# Patient Record
Sex: FEMALE | Race: OTHER | HISPANIC OR LATINO | Employment: FULL TIME | URBAN - METROPOLITAN AREA
[De-identification: names, ages, dates, MRNs, and addresses within clinical notes are randomized per-mention and may not be internally consistent; named-entity substitution may affect disease eponyms.]

---

## 2019-08-08 ENCOUNTER — TRANSCRIBE ORDERS (OUTPATIENT)
Dept: ADMINISTRATIVE | Age: 44
End: 2019-08-08

## 2019-08-08 ENCOUNTER — APPOINTMENT (OUTPATIENT)
Dept: URGENT CARE | Age: 44
End: 2019-08-08
Payer: OTHER MISCELLANEOUS

## 2019-08-08 ENCOUNTER — APPOINTMENT (OUTPATIENT)
Dept: RADIOLOGY | Age: 44
End: 2019-08-08
Payer: OTHER MISCELLANEOUS

## 2019-08-08 DIAGNOSIS — T14.90XA INJURY: ICD-10-CM

## 2019-08-08 DIAGNOSIS — T14.90XA INJURY: Primary | ICD-10-CM

## 2019-08-08 PROCEDURE — G0382 LEV 3 HOSP TYPE B ED VISIT: HCPCS | Performed by: PREVENTIVE MEDICINE

## 2019-08-08 PROCEDURE — 99283 EMERGENCY DEPT VISIT LOW MDM: CPT | Performed by: PREVENTIVE MEDICINE

## 2019-08-15 ENCOUNTER — APPOINTMENT (OUTPATIENT)
Dept: URGENT CARE | Age: 44
End: 2019-08-15
Payer: OTHER MISCELLANEOUS

## 2019-08-15 PROCEDURE — 99213 OFFICE O/P EST LOW 20 MIN: CPT | Performed by: PREVENTIVE MEDICINE

## 2019-08-21 ENCOUNTER — TRANSCRIBE ORDERS (OUTPATIENT)
Dept: ADMINISTRATIVE | Facility: HOSPITAL | Age: 44
End: 2019-08-21

## 2019-08-21 DIAGNOSIS — M25.572 ACUTE LEFT ANKLE PAIN: Primary | ICD-10-CM

## 2019-08-28 ENCOUNTER — HOSPITAL ENCOUNTER (OUTPATIENT)
Dept: MRI IMAGING | Facility: HOSPITAL | Age: 44
Discharge: HOME | End: 2019-08-28
Attending: PREVENTIVE MEDICINE
Payer: OTHER MISCELLANEOUS

## 2019-08-28 DIAGNOSIS — M25.572 ACUTE LEFT ANKLE PAIN: ICD-10-CM

## 2019-08-28 PROCEDURE — 73721 MRI JNT OF LWR EXTRE W/O DYE: CPT

## 2019-08-29 ENCOUNTER — APPOINTMENT (OUTPATIENT)
Dept: URGENT CARE | Age: 44
End: 2019-08-29

## 2019-09-19 ENCOUNTER — APPOINTMENT (OUTPATIENT)
Dept: URGENT CARE | Age: 44
End: 2019-09-19
Payer: OTHER MISCELLANEOUS

## 2019-09-19 PROCEDURE — 99213 OFFICE O/P EST LOW 20 MIN: CPT | Performed by: PREVENTIVE MEDICINE

## 2019-10-03 ENCOUNTER — APPOINTMENT (OUTPATIENT)
Dept: URGENT CARE | Age: 44
End: 2019-10-03
Payer: OTHER MISCELLANEOUS

## 2019-10-03 PROCEDURE — 99213 OFFICE O/P EST LOW 20 MIN: CPT | Performed by: PHYSICIAN ASSISTANT

## 2019-10-07 ENCOUNTER — OFFICE VISIT (OUTPATIENT)
Dept: OBGYN CLINIC | Facility: HOSPITAL | Age: 44
End: 2019-10-07
Payer: OTHER MISCELLANEOUS

## 2019-10-07 VITALS
HEART RATE: 80 BPM | DIASTOLIC BLOOD PRESSURE: 87 MMHG | HEIGHT: 66 IN | WEIGHT: 200 LBS | BODY MASS INDEX: 32.14 KG/M2 | SYSTOLIC BLOOD PRESSURE: 126 MMHG

## 2019-10-07 DIAGNOSIS — M77.50 TENDONITIS OF ANKLE: Primary | ICD-10-CM

## 2019-10-07 PROCEDURE — 99203 OFFICE O/P NEW LOW 30 MIN: CPT | Performed by: PHYSICIAN ASSISTANT

## 2019-10-07 RX ORDER — IBUPROFEN 200 MG
200 TABLET ORAL EVERY 6 HOURS PRN
COMMUNITY

## 2019-10-07 NOTE — PROGRESS NOTES
Assessment/Plan   Diagnoses and all orders for this visit:    Tendonitis of ankle  - High CAM boot fitted and dispensed today  - Activity as tolerated in the boot  - Ice, NSAIDs as needed  - Start PT  - Follow up with sports medicine in 3 weeks  Subjective   Patient ID: Margarette Brito is a 37 y o  female  Vitals:    10/07/19 1407   BP: 126/87   Pulse: [de-identified]     42yo female comes in for her right ankle  She was injured at work in July when a pallet jack drove 2 pallets into her ankle  An MRI showed peroneal tendonitis  She has been seeing an unknown type of doctor in [facility]  She doesn't know the doctor's name  She states she has had no treatment  The pain is in the lateral aspect of the ankle/foot  The pain is dull in character, mild in severity, pain does not radiate and is not associated with numbness  The following portions of the patient's history were reviewed and updated as appropriate: allergies, current medications, past family history, past medical history, past social history, past surgical history and problem list     Review of Systems  Ortho Exam  History reviewed  No pertinent past medical history  History reviewed  No pertinent surgical history  Family History   Problem Relation Age of Onset    Diabetes Mother     Diabetes Father      Social History     Occupational History    Not on file   Tobacco Use    Smoking status: Never Smoker    Smokeless tobacco: Never Used   Substance and Sexual Activity    Alcohol use: Not on file    Drug use: Not on file    Sexual activity: Not on file       Review of Systems   Constitutional: Negative  HENT: Negative  Eyes: Negative  Respiratory: Negative  Cardiovascular: Negative  Gastrointestinal: Negative  Endocrine: Negative  Genitourinary: Negative  Musculoskeletal: As below      Allergic/Immunologic: Negative  Neurological: Negative  Hematological: Negative  Psychiatric/Behavioral: Negative  Objective   Physical Exam        I have personally reviewed pertinent films in PACS and my interpretation is no fracture        · Constitutional: Awake, Alert, Oriented  · Eyes: EOMI  · Psych: Mood and affect appropriate  · Heart: regular rate and rhythm  · Lungs: No audible wheezing  · Abdomen: soft  · Lymph: no lymphedema             left ankle:  - Appearance   No swelling, discoloration, deformity, or ecchymosis  - Palpation   No tenderness about the medial / lateral malleoli, deltoid, proximal fibula, atf, cf, ptf, talus, achilles or 5th MT and + tenderness over the peroneal tendons  - ROM   Full, pain-free, active ROM  - Special Tests   Negative anterior drawer  - Motor   normal 5/5 in all planes  - NVI distally

## 2019-10-07 NOTE — PATIENT INSTRUCTIONS
Ice Pack Application   WHAT YOU NEED TO KNOW:   Ice can be used to decrease swelling and pain after an injury or surgery  Common injuries that may benefit from ice therapy are sprains, strains, and bruises  The use of ice is most effective in the first 1 to 3 days after an injury  DISCHARGE INSTRUCTIONS:   How to apply ice:   · Fill a bag with crushed ice about half full  Remove the air from the bag before you close it  You can also use a bag of frozen vegetables  · Wrap the ice pack in a cloth to protect your skin from frostbite or other injury  · Put the ice over the injured area for 20 to 30 minutes or as long as directed  · Check your skin after about 30 seconds for color changes or blistering  Remove the ice if you notice skin changes or you feel burning or numbness in the area  · Throw the ice pack away after use  · Apply ice to your injured area 4 times each day or as directed  Ask your healthcare provider how many days you should apply ice  Contact your healthcare provider if:   · You see blisters, whitening of your skin, or a bluish color to your skin after using ice  · You feel burning or numbness when using ice  · You have questions about the use of ice packs  © 2017 [facility] Information is for End User's use only and may not be sold, redistributed or otherwise used for commercial purposes  All illustrations and images included in CareNotes® are the copyrighted property of [employer]  or Dario Gonzales  The above information is an  only  It is not intended as medical advice for individual conditions or treatments  Talk to your doctor, nurse or pharmacist before following any medical regimen to see if it is safe and effective for you  Safe Use of NSAIDs   WHAT YOU NEED TO KNOW:   NSAIDs are medicines that are used to decrease pain, swelling, and fever  NSAIDs are available with or without a doctor's order   NSAIDs that you can buy without a doctor's order include aspirin, ibuprofen, and naproxen  DISCHARGE INSTRUCTIONS:   Return to the emergency department if:   · You have swelling around your mouth or trouble breathing  · You are breathing fast or you have a fast heartbeat  · You have nausea, vomiting, or abdominal pain  · You have blood in your vomit or bowel movements  · You have a seizure  Contact your healthcare provider if:   · You have a headache or become confused  · You develop hearing loss or ringing in your ears  · You develop itching, a rash, or hives  · You have swelling around your lower legs, feet, ankles, and hands  · You do not know how much NSAIDs to give to your child  · You have questions or concerns about your condition or care  How to give NSAIDs to your child safely:   · Read the directions on the label  Find out if the medicine is right for your child's age and how much to give to your child  The dose for your child's weight or age should be listed  Do not  give your child more than the recommended amount  · Use the measuring tool that came with the medicine  Do not  use another measuring tool, such as a kitchen spoon  Other measuring tools do not provide the right amount of medicine  How to take NSAIDs safely:   · Read the directions on the label to learn how much medicine you should take and often to take it  Do not take more than the recommended amount  · Talk to your healthcare provider if you need take NSAIDs for more than 30 days  The longer you take NSAIDs, the higher your risk of side effects will be  You may need to take other medicines to decrease your risk of side effects such as stomach bleeding  · Do not take an over-the-counter NSAIDs with prescription NSAIDs  The combined amount of NSAIDs may be too high  · Tell your healthcare provider about other medicines you take  Some medicines can increase the risk of side effects from NSAIDs   Your healthcare provider will tell you if it is okay to take NSAIDs and how to take them  Who should not take NSAIDs:  Certain people should avoid or limit NSAIDs  Do not  give NSAIDs to children under 10months of age without direction from your child's doctor  Do not give aspirin to children under 25years of age  Your child could develop Reye syndrome if he takes aspirin  Reye syndrome can cause life-threatening brain and liver damage  Check your child's medicine labels for aspirin, salicylates, or oil of wintergreen  Talk to your healthcare provider before you take NSAIDs if any of the following apply to you:  · You have reflux disease, a peptic ulcer, H pylori infection, or bleeding in your stomach or intestines  · You have a bleeding disorder, or you take blood-thinning medicine  · You are allergic to aspirin or other NSAIDs  · You have liver or kidney or disease  · You have high blood pressure or heart disease  · You have 3 or more alcoholic drinks each day  · You are pregnant  What you need to know about an NSAID overdose:  Certain health problems can occur if you take too much NSAID medicine at one time or over time  Problems include nausea, vomiting, and abdominal pain  You may develop gastritis, peptic ulcers, and stomach bleeding  You may also develop fluid retention, heart problems, and kidney problems  NSAIDs can worsen high blood pressure  You may become confused, or you may have a headache, hearing loss, or hallucinations  An overdose of aspirin may also cause rapid breathing, a rapid heartbeat, or seizures  What to do if you think you or your child took too much NSAID medicine:  Call the [facility] at 1-234.554.2789 immediately  © 2017 [employer]  Information is for End User's use only and may not be sold, redistributed or otherwise used for commercial purposes   All illustrations and images included in CareNotes® are the copyrighted property of [employer]  or Dario Gonzales  The above information is an  only  It is not intended as medical advice for individual conditions or treatments  Talk to your doctor, nurse or pharmacist before following any medical regimen to see if it is safe and effective for you

## 2019-10-07 NOTE — LETTER
October 7, 2019     Patient: Carlin Mcconnell   YOB: 1975   Date of Visit: 10/7/2019       To Whom it May Concern:    Carlin Mcconnell is under my professional care  She was seen in my office on 10/7/2019  She may return to work with limitations : allow to wear the CAM boot  No restrictions in the boot  Follow up in 3 weeks       If you have any questions or concerns, please don't hesitate to call           Sincerely,          Lroa Lazaro PA-C        CC: Carlin Mcconnell

## 2019-10-11 ENCOUNTER — APPOINTMENT (OUTPATIENT)
Dept: URGENT CARE | Age: 44
End: 2019-10-11
Payer: OTHER MISCELLANEOUS

## 2019-10-11 PROCEDURE — 99213 OFFICE O/P EST LOW 20 MIN: CPT | Performed by: PREVENTIVE MEDICINE

## 2019-10-14 ENCOUNTER — EVALUATION (OUTPATIENT)
Dept: PHYSICAL THERAPY | Facility: CLINIC | Age: 44
End: 2019-10-14
Payer: OTHER MISCELLANEOUS

## 2019-10-14 DIAGNOSIS — M77.50 TENDONITIS OF ANKLE: Primary | ICD-10-CM

## 2019-10-14 PROCEDURE — 97161 PT EVAL LOW COMPLEX 20 MIN: CPT | Performed by: PHYSICAL MEDICINE & REHABILITATION

## 2019-10-14 PROCEDURE — 97110 THERAPEUTIC EXERCISES: CPT | Performed by: PHYSICAL MEDICINE & REHABILITATION

## 2019-10-14 NOTE — PROGRESS NOTES
PT Evaluation     Today's date: 10/14/2019  Patient name: Cabrera Ibrahim  : 1975  MRN: [id number]  Referring provider: Caterina Edmond  Dx:   Encounter Diagnosis     ICD-10-CM    1  Tendonitis of ankle M77 50        Start Time: 1415  Stop Time: 1457  Total time in clinic (min): 42 minutes    Assessment  Assessment details: Pt is a 37 y o  female presenting to outpatient physical therapy with Tendonitis of ankle  Pt presents with pain, decreased range of motion, decreased strength, and decreased tolerance to activity  Pt would benefit from skilled physical therapy to address limitations and to achieve goals  Thank you for this referral    Impairments: abnormal gait, abnormal or restricted ROM, impaired balance, impaired physical strength and lacks appropriate home exercise program    Symptom irritability: highUnderstanding of Dx/Px/POC: poor   Prognosis: fair    Goals  ST  Patient will report 25% decrease in pain in 4 weeks  2  Patient will demonstrate 25% improvement in ROM in 4 weeks  3  Patient will demonstrate 1/2 grade improvement in strength in 4 weeks  LT  Patient will be able to perform IADLS without restriction or pain by discharge  2  Patient will be independent in HEP by discharge  3  Patient will be able to return to recreational/work duties without restriction or pain by discharge        Plan  Patient would benefit from: PT eval and skilled physical therapy  Planned modality interventions: biofeedback, TENS, cryotherapy and thermotherapy: hydrocollator packs  Planned therapy interventions: manual therapy, neuromuscular re-education, patient education, postural training, strengthening, stretching, therapeutic activities, therapeutic exercise, home exercise program, balance/weight bearing training and balance  Frequency: 2x week  Duration in weeks: 6  Treatment plan discussed with: patient        Subjective Evaluation    History of Present Illness  Mechanism of injury: Injury on 2019  Pt was seen in the ED on the day of the injury  Pt reports that her ankle was wedged between 2 pallets while at work  She has been wearing a CAM boot for 1 week, she has been instructed to wear the boot until her follow up on 10/28/19  MRI:  1  Common peroneal tenosynovitis with peroneus brevis tendinosis as well as an early split of the tendon at the level of the lateral malleolus but with an intact insertion at the 5th metatarsal base    2   Mild plantar fasciitis without evidence of tear      Pain  Current pain ratin  At best pain ratin  At worst pain rating: 10  Quality: throbbing  Aggravating factors: nothing  Progression: worsening    Patient Goals  Patient goals for therapy: decreased pain          Objective     Active Range of Motion   Left Ankle/Foot   Dorsiflexion (ke): -12 degrees with pain  Plantar flexion: 20 degrees with pain  Inversion: 25 degrees with pain  Eversion: 9 degrees with pain    Strength/Myotome Testing     Additional Strength Details  MSTT: strong and painful in all directions, eversion worst        Flowsheet Rows      Most Recent Value   PT/OT G-Codes   Current Score  38   Projected Score  62             Precautions: CAM boot until at least 10/28/19      Manual              L ankle PROM                                                    Reassess tolerance to IASTM and MFD with decrease in sxs                 Exercise Diary              Bike             gastroc stretch             Ankle divya/inver stretch             BAPS             Seated HR/TR             marbles             Towel scrunch             Self STM c golf ball             S/L ankle inversion              S/L ankle eversion                                                                                                                                                                             Modalities              gameready

## 2019-10-17 ENCOUNTER — OFFICE VISIT (OUTPATIENT)
Dept: PHYSICAL THERAPY | Facility: CLINIC | Age: 44
End: 2019-10-17
Payer: OTHER MISCELLANEOUS

## 2019-10-17 DIAGNOSIS — M77.50 TENDONITIS OF ANKLE: Primary | ICD-10-CM

## 2019-10-17 PROCEDURE — 97016 VASOPNEUMATIC DEVICE THERAPY: CPT

## 2019-10-17 PROCEDURE — 97140 MANUAL THERAPY 1/> REGIONS: CPT

## 2019-10-17 PROCEDURE — 97110 THERAPEUTIC EXERCISES: CPT

## 2019-10-17 PROCEDURE — 97112 NEUROMUSCULAR REEDUCATION: CPT

## 2019-10-17 NOTE — PROGRESS NOTES
Daily Note     Today's date: 10/17/2019  Patient name: Jamee Gruber  : 1975  MRN: [id number]  Referring provider: Sherry Foster  Dx:   Encounter Diagnosis     ICD-10-CM    1  Tendonitis of ankle M77 50                   Subjective: Pt reports continued high levels of R ankle pain upon arrival that is worsened on bike and with gastroc stretch with strap and reduced throughout session  Objective: See treatment diary below     Precautions: CAM boot until at least 10/28/19    Manual  10/17            L ankle PROM SH                         Reassess tolerance to IASTM and MFD with decrease in sxs               Exercise Diary  10/17            Bike ROM 4'            gastroc stretch Seated w/strap 30"x3            Ankle divya/ inver stretch 15"x5 ea            BAPS- AP, ML, CW/CCW L2 15x ea            Seated HR/TR 20x ea            marbles 2x            Towel scrunch 30x            Self STM c golf ball 2'            S/L ankle inversion              S/L ankle eversion                                                                                Modalities  10/17            Gameready 10' low, coldest                                          Assessment: Tolerated treatment well  Patient demonstrated fatigue post treatment, exhibited good technique with therapeutic exercises and would benefit from continued PT to improve strength and mobility as well as reduce pain  Plan: Continue per plan of care  Progress treatment as tolerated      Alia Billings, PTA Self

## 2019-10-21 ENCOUNTER — OFFICE VISIT (OUTPATIENT)
Dept: PHYSICAL THERAPY | Facility: CLINIC | Age: 44
End: 2019-10-21
Payer: OTHER MISCELLANEOUS

## 2019-10-21 DIAGNOSIS — M77.50 TENDONITIS OF ANKLE: Primary | ICD-10-CM

## 2019-10-21 PROCEDURE — 97150 GROUP THERAPEUTIC PROCEDURES: CPT | Performed by: PHYSICAL MEDICINE & REHABILITATION

## 2019-10-21 PROCEDURE — 97140 MANUAL THERAPY 1/> REGIONS: CPT | Performed by: PHYSICAL MEDICINE & REHABILITATION

## 2019-10-21 PROCEDURE — 97016 VASOPNEUMATIC DEVICE THERAPY: CPT | Performed by: PHYSICAL MEDICINE & REHABILITATION

## 2019-10-21 NOTE — PROGRESS NOTES
Daily Note     Today's date: 10/21/2019  Patient name: Margarette Brito  : 1975  MRN: [id number]  Referring provider: Matilde Browne  Dx:   Encounter Diagnosis     ICD-10-CM    1  Tendonitis of ankle M77 50        Start Time: 1720  Stop Time: 1820  Total time in clinic (min): 60 minutes    Subjective: Pt states that she is feeling a little better than last visit  Objective: See treatment diary below      Assessment: Tolerated treatment fair  Patient demonstrated fatigue post treatment, exhibited good technique with therapeutic exercises, would benefit from continued PT and high subject reactivity with PROM and AROM  Plan: Progress treament per protocol        Precautions: CAM boot until at least 10/28/19    Manual  10/17 10/21           L ankle PROM SH NC                        Reassess tolerance to IASTM and MFD with decrease in sxs  --             Exercise Diary  10/17 10/21           Bike ROM 4' 5' ROM           gastroc stretch Seated w/strap 30"x3 3 x 30"           Ankle divya/ inver stretch 15"x5 ea np           BAPS- AP, ML, CW/CCW L2 15x ea np           Seated HR/TR 20x ea 30 ea           marbles 2x 2x           Towel scrunch 30x            Self STM c golf ball 2' 2'           S/L ankle inversion   20            S/L ankle eversion  20                                                                              Modalities  10/17 10/21           Gameready 10' low, coldest 10'  low

## 2019-10-24 ENCOUNTER — APPOINTMENT (OUTPATIENT)
Dept: PHYSICAL THERAPY | Facility: CLINIC | Age: 44
End: 2019-10-24
Payer: OTHER MISCELLANEOUS

## 2019-10-28 ENCOUNTER — OFFICE VISIT (OUTPATIENT)
Dept: OBGYN CLINIC | Facility: MEDICAL CENTER | Age: 44
End: 2019-10-28
Payer: OTHER MISCELLANEOUS

## 2019-10-28 VITALS
HEIGHT: 66 IN | WEIGHT: 226 LBS | DIASTOLIC BLOOD PRESSURE: 86 MMHG | HEART RATE: 77 BPM | SYSTOLIC BLOOD PRESSURE: 124 MMHG | BODY MASS INDEX: 36.32 KG/M2

## 2019-10-28 DIAGNOSIS — M77.50 TENDONITIS OF ANKLE: ICD-10-CM

## 2019-10-28 DIAGNOSIS — S86.312A PERONEAL TENDON TEAR, LEFT, INITIAL ENCOUNTER: Primary | ICD-10-CM

## 2019-10-28 PROCEDURE — 99214 OFFICE O/P EST MOD 30 MIN: CPT | Performed by: FAMILY MEDICINE

## 2019-10-28 RX ORDER — SUMATRIPTAN 25 MG/1
TABLET, FILM COATED ORAL
COMMUNITY
Start: 2018-10-29

## 2019-10-28 RX ORDER — PROPRANOLOL HYDROCHLORIDE 10 MG/1
10 TABLET ORAL 2 TIMES DAILY
COMMUNITY
Start: 2018-10-05

## 2019-10-28 RX ORDER — KETOTIFEN FUMARATE 0.35 MG/ML
1 SOLUTION/ DROPS OPHTHALMIC 2 TIMES DAILY
COMMUNITY
Start: 2016-08-11

## 2019-10-28 NOTE — PROGRESS NOTES
1  Peroneal tendon tear, left, initial encounter     2  Tendonitis of ankle       No orders of the defined types were placed in this encounter  Imaging Studies (I personally reviewed images in PACS and report):  MRI left ankle 08/28/2019:  1  Common peroneal tenosynovitis with peroneus brevis tendinosis as well as an early split of the tendon at the level of the lateral malleolus but with an intact insertion at the 5th metatarsal base  2   Mild plantar fasciitis without evidence of tear  IMPRESSION:  Left peroneal split tear with tenosynovitis  Work injury  Date of Injury:  July 2019  Initial visit with Sports Medicine:  10/28/2019      Repeat X-ray next visit:   None      Return in about 2 weeks (around 11/11/2019)  Patient Instructions   Explained the patient that she has significant stiffness in her ankle at this time  Explained that she was split tear of the peroneal tendon with tenosynovitis  I recommended trial of conservative treatment and discontinuing her CAM boot at this time  She has completed approximately 3 visits of physical therapy including evaluation since her initial injury July  Since she has significant pain with standing I recommended sedentary work only as light duty restriction at this time  CHIEF COMPLAINT:  Right ankle pain    HPI:  Marbella Yoon is a 37 y o  female  who presents for       Visit 10/20/2019:  Here for evaluation of right ankle pain    Summary of orthopedic physician assistant no 10/07/2019:  Patient injured at work in July when [address] 2 pallets into ankle  She had MRI which revealed peroneal tendinitis  Start high Cam boot  Referred to Sports Medicine  Patient states she has had no improvement since her initial injury event in July 2019  She states that recently she has had worsening of her pain    She points to whole ankle as source of her pain but she later clarifies that she has majority of her pain lateral aspect of her ankle  Patient states that she has significant pain when standing at work bending and lifting  This causes worsening swelling and pain of the ankle per    Review of Systems   Constitutional: Negative for chills, fever and unexpected weight change  HENT: Negative for hearing loss, nosebleeds and sore throat  Eyes: Negative for pain, redness and visual disturbance  Respiratory: Negative for cough, shortness of breath and wheezing  Cardiovascular: Negative for chest pain, palpitations and leg swelling  Gastrointestinal: Negative for abdominal distention, nausea and vomiting  Endocrine: Negative for polydipsia and polyuria  Genitourinary: Negative for dysuria and hematuria  Skin: Negative for rash and wound  Neurological: Negative for dizziness, numbness and headaches  Psychiatric/Behavioral: Negative for decreased concentration and suicidal ideas  Following history reviewed and update:    History reviewed  No pertinent past medical history  History reviewed  No pertinent surgical history    Social History   Social History     Substance and Sexual Activity   Alcohol Use Not on file     Social History     Substance and Sexual Activity   Drug Use Not on file     Social History     Tobacco Use   Smoking Status Never Smoker   Smokeless Tobacco Never Used     Family History   Problem Relation Age of Onset    Diabetes Mother     Diabetes Father      No Known Allergies       Physical Exam  /86   Pulse 77   Ht 5' 6" (1 676 m)   Wt 103 kg (226 lb)   BMI 36 48 kg/m²     Constitutional:  see vital signs  Gen: well-developed, normocephalic/atraumatic, well-groomed  Eyes: No inflammation or discharge of conjunctiva or lids; sclera clear   Pharynx: no inflammation, lesion, or mass of lips  Neck: supple, no masses, non-distended  MSK: no inflammation, lesion, mass, or clubbing of nails and digits except for other than mentioned below  SKIN: no visible rashes or skin lesions  Pulmonary/Chest: Effort normal  No respiratory distress  NEURO: cranial nerves grossly intact  PSYCH:  Alert and oriented to person, place, and time; recent and remote memory intact; mood normal, no depression, anxiety, or agitation, judgment and insight good and intact     Ortho Exam  Left  ANKLE & FOOT EXAM  Observation  GAIT:  Partial weight-bearing Cam walker boot, antalgic left gait    Inspection  Erythema: no  Ecchymosis: no  Edema:  none      Tenderness  Proximal Fibula: no  (Maisonneuve frx)  AiTFL: no  (2cm proximal-medial to tip lateral malleolus 92% sens, 29% spec)  ATFL: no  CFL: no  PTFL: no  Achilles:  no  deltoid: No  Peroneal: + reproduces chief complaint of pain  Tibialis Anterior: no  Tibialis Posterior: no    Bony Tenderpoints:  Lateral Malleolus: no  Base of 5th MT: no  Medial Malleolus: no  Navicular: no  Talar dome: No    ROM  Dorsiflexion: intact passive; active- moderately diminished due to pain  Plantarflexion: intact past; active-moderately diminished due to pain    Muscle Strength  Pronation: intact with reproduction of pain lateral ankle  Supination: intact     Tib-Fib Squeeze: negative  (cskghapyscck-lr-infdmsgeovlunv squeeze; 26% sens, 88% spec; rule in test)    Calcaneal Squeeze: negative    Dorsiflexion (+) ER Stress Test: negative  (reproduce ATiFL mech; 71% sens, 63% spec)      Left CALF EXAM:  No swelling erythema or increased warmth  No palpable cords  Tenderness: none  Negative Sofie sign    Left FOOT EXAM:  No swelling, erythema or increased warmth  Tenderness: none  ROM Toes extension: intact  ROM Toes flexion: intact  Strength Toes: 5/5 flex, ext  Sensation intact  Capillary refill intact  Metatarsal squeeze negative     Left ACHILLES EXAMINATION:  + scar posterior Achilles insertional point-patient states that this has been from childhood accident at 9years old  Denies any recent laceration or wound to this area    Simmonds Triad:  Palpable Gap or Defect of Achilles: none  Angle of Declination: angle of baseline plantarflexion symmetric to contralateral side  Matles Test (patient prone, intact and symmetric plantarflexion of ankle when flexing knee): intact  Larson's Calf Squeeze Test: intact obligatory plantarflexion    Procedures          Medical assistant Alirio Baker present for encounter and provided interpretation

## 2019-10-28 NOTE — LETTER
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

## 2019-10-28 NOTE — PATIENT INSTRUCTIONS
Explained the patient that she has significant stiffness in her ankle at this time  Explained that she was split tear of the peroneal tendon with tenosynovitis  I recommended trial of conservative treatment and discontinuing her CAM boot at this time  She has completed approximately 3 visits of physical therapy including evaluation since her initial injury July  Since she has significant pain with standing I recommended sedentary work only as light duty restriction at this time

## 2019-10-29 ENCOUNTER — OFFICE VISIT (OUTPATIENT)
Dept: PHYSICAL THERAPY | Facility: CLINIC | Age: 44
End: 2019-10-29
Payer: OTHER MISCELLANEOUS

## 2019-10-29 DIAGNOSIS — M77.50 TENDONITIS OF ANKLE: Primary | ICD-10-CM

## 2019-10-29 PROCEDURE — 97140 MANUAL THERAPY 1/> REGIONS: CPT

## 2019-10-29 PROCEDURE — 97112 NEUROMUSCULAR REEDUCATION: CPT

## 2019-10-29 PROCEDURE — 97016 VASOPNEUMATIC DEVICE THERAPY: CPT

## 2019-10-29 PROCEDURE — 97110 THERAPEUTIC EXERCISES: CPT

## 2019-10-29 NOTE — PROGRESS NOTES
Daily Note     Today's date: 10/29/2019  Patient name: Margarette Brito  : 1975  MRN: [id number]  Referring provider: Matilde Browne  Dx:   Encounter Diagnosis     ICD-10-CM    1  Tendonitis of ankle M77 50      Start Time:   Stop Time:   Total time in clinic (min): 62 minutes  Subjective: Pt arrives to today's appointment reporting some pain in (L) ankle  Pt CAM boot d/c'd by ortho yesterday - arrives to today's treatment wearing sneakers  Objective: See treatment diary below    Assessment: Pt reported pain along medal aspect of (L) ankle/distal tibia when stretching (L) ankle into eversion and DF  Pt ankle and 1st met head TTP during STM - discussed adding STM to HEP  Polerated treatment fair-good  Patient demonstrated fatigue post treatment, exhibited good technique with therapeutic exercises, would benefit from continued PT and high subject reactivity with PROM and AROM  Plan: Progress treament per protocol        Precautions: CAM boot until at least 10/28/19  Manual  10/17 10/21 10/29          L ankle PROM SH NC SP          STM to (L) ankle, plantar surface, achilles   SP          Reassess tolerance to IASTM and MFD with decrease in sxs  --             Exercise Diary  10/17 10/21 10/29          Bike ROM 4' 5' ROM 7' ROM          gastroc stretch Seated w/strap 30"x3 3 x 30" /c towel  30"x3            Ankle divya/ inver stretch 15"x5 ea np 30"x3ea          BAPS- AP, ML, CW/CCW L2 15x ea np L2 30ea          Seated HR/TR 20x ea 30 ea 30ea          marbles 2x 2x 4x          Towel scrunch 30x            Self STM c golf ball 2' 2' 3'          S/L ankle inversion   20            S/L ankle eversion  20                                                                              Modalities  10/17 10/21 10/29          Gameready 10' low, coldest 10'  low 10' med pressure, 44 deg

## 2019-10-31 ENCOUNTER — OFFICE VISIT (OUTPATIENT)
Dept: PHYSICAL THERAPY | Facility: CLINIC | Age: 44
End: 2019-10-31
Payer: OTHER MISCELLANEOUS

## 2019-10-31 ENCOUNTER — APPOINTMENT (OUTPATIENT)
Dept: URGENT CARE | Age: 44
End: 2019-10-31
Payer: OTHER MISCELLANEOUS

## 2019-10-31 DIAGNOSIS — M77.50 TENDONITIS OF ANKLE: Primary | ICD-10-CM

## 2019-10-31 PROCEDURE — 99213 OFFICE O/P EST LOW 20 MIN: CPT | Performed by: PREVENTIVE MEDICINE

## 2019-10-31 PROCEDURE — 97110 THERAPEUTIC EXERCISES: CPT

## 2019-10-31 PROCEDURE — 97140 MANUAL THERAPY 1/> REGIONS: CPT

## 2019-10-31 PROCEDURE — 97112 NEUROMUSCULAR REEDUCATION: CPT

## 2019-10-31 NOTE — PROGRESS NOTES
Daily Note     Today's date: 10/31/2019  Patient name: Royce Coyle  : 1975  MRN: [id number]  Referring provider: Mary Jc  Dx:   Encounter Diagnosis     ICD-10-CM    1  Tendonitis of ankle M77 50      Start Time:   Stop Time:   Total time in clinic (min): 47 minutes  Subjective: Pt reports increased (L) ankle/foot pain since LV  Objective: See treatment diary below    Assessment: Polerated treatment fair-good  Patient demonstrated fatigue post treatment, exhibited good technique with therapeutic exercises, would benefit from continued PT and high subject reactivity with PROM and AROM  Plan: Progress treament per protocol        Precautions: CAM boot until at least 10/28/19  Manual  10/17 10/21 10/29 10/31         L ankle PROM SH NC SP SP         STM to (L) ankle, plantar surface, achilles   SP          Reassess tolerance to IASTM and MFD with decrease in sxs  --             Exercise Diary  10/17 10/21 10/29 10/31         Bike ROM 4' 5' ROM 7' ROM 5' ROM         gastroc stretch Seated w/strap 30"x3 3 x 30" /c towel  30"x3   /c towel  30"x3         Ankle divya/ inver stretch 15"x5 ea np 30"x3ea 30"x3ea         BAPS- AP, ML, CW/CCW L2 15x ea np L2 30ea L# 30ea         Seated HR/TR 20x ea 30 ea 30ea 30ea         marbles 2x 2x 4x 4x         Towel scrunch 30x   30x         Self STM c golf ball 2' 2' 3' 3'         S/L ankle inversion   20            S/L ankle eversion  20           shortfoot    3"x15                                                               Modalities  10/17 10/21 10/29 10/31         Gameready 10' low, coldest 10'  low 10' med pressure, 44 deg deferred

## 2019-11-04 ENCOUNTER — OFFICE VISIT (OUTPATIENT)
Dept: PHYSICAL THERAPY | Facility: CLINIC | Age: 44
End: 2019-11-04
Payer: OTHER MISCELLANEOUS

## 2019-11-04 DIAGNOSIS — M77.50 TENDONITIS OF ANKLE: Primary | ICD-10-CM

## 2019-11-04 PROCEDURE — 97140 MANUAL THERAPY 1/> REGIONS: CPT

## 2019-11-04 PROCEDURE — 97110 THERAPEUTIC EXERCISES: CPT

## 2019-11-04 PROCEDURE — 97112 NEUROMUSCULAR REEDUCATION: CPT

## 2019-11-04 NOTE — PROGRESS NOTES
Daily Note     Today's date: 2019  Patient name: Bria Vaughan  : 1975  MRN: [id number]  Referring provider: Laquita Gonzalez  Dx:   Encounter Diagnosis     ICD-10-CM    1  Tendonitis of ankle M77 50      Start Time: 172  Stop Time:   Total time in clinic (min): 55 minutes  Subjective: Pt arrives to today's treatment reporting she has noticed an improvement in (L) ankle/foot mobility; however, continues to have elevated pain Sx in her (L) ankle/foot, especially at night  Objective: See treatment diary below    Assessment: MHP applied prior to manual stretching of (L) ankle with noted improved tolerance to stretching  Pt tolerated progression of TE program fair-good with c/o increased (L) ankle/foot pain  Continue to work on patient's endurance to [employer] through (L)LE and increasing (L) ankle ROM  Pt would benefit from continued PT to improve (L) ankle ROM, strength, and overall mobility  Plan: Cont /c PT POC  Progress as tolerated       Precautions: CAM boot until at least 10/28/19  Manual  10/17 10/21 10/29 10/31 11/04        L ankle PROM SH NC SP SP SP        STM to (L) ankle, plantar surface, achilles   SP          Reassess tolerance to IASTM and MFD with decrease in sxs  --             Exercise Diary  10/17 10/21 10/29 10/31 11/04        Bike ROM 4' 5' ROM 7' ROM 5' ROM 8' ROM        gastroc stretch Seated w/strap 30"x3 3 x 30" /c towel  30"x3   /c towel  30"x3 /c towel  30"x3        Ankle divya/ inver stretch 15"x5 ea np 30"x3ea 30"x3ea 30"x3ea        BAPS- AP, ML, CW/CCW L2 15x ea np L2 30ea L# 30ea         Seated HR/TR 20x ea 30 ea 30ea 30ea Standing  20xea        marbles 2x 2x 4x 4x         Towel scrunch 30x   30x         Self STM c golf ball 2' 2' 3' 3'         S/L ankle inversion   20            S/L ankle eversion  20           shortfoot    3"x15         rockerboard     20xea        Tandem on foam     20"x2ea        SLS  (L)     foam 10"x2 Level  prolonged Modalities  10/17 10/21 10/29 10/31 11/04        Gameready 10' low, coldest 10'  low 10' med pressure, 44 deg deferred         P      10' /p lorri                         [place], [place]

## 2019-11-07 ENCOUNTER — APPOINTMENT (OUTPATIENT)
Dept: PHYSICAL THERAPY | Facility: CLINIC | Age: 44
End: 2019-11-07
Payer: OTHER MISCELLANEOUS

## 2019-11-11 ENCOUNTER — OFFICE VISIT (OUTPATIENT)
Dept: PHYSICAL THERAPY | Facility: CLINIC | Age: 44
End: 2019-11-11
Payer: OTHER MISCELLANEOUS

## 2019-11-11 ENCOUNTER — OFFICE VISIT (OUTPATIENT)
Dept: OBGYN CLINIC | Facility: MEDICAL CENTER | Age: 44
End: 2019-11-11
Payer: OTHER MISCELLANEOUS

## 2019-11-11 VITALS
WEIGHT: 222 LBS | DIASTOLIC BLOOD PRESSURE: 87 MMHG | HEIGHT: 66 IN | BODY MASS INDEX: 35.68 KG/M2 | SYSTOLIC BLOOD PRESSURE: 129 MMHG | HEART RATE: 76 BPM

## 2019-11-11 DIAGNOSIS — M77.50 TENDONITIS OF ANKLE: Primary | ICD-10-CM

## 2019-11-11 DIAGNOSIS — S86.312A PERONEAL TENDON TEAR, LEFT, INITIAL ENCOUNTER: Primary | ICD-10-CM

## 2019-11-11 PROCEDURE — 97112 NEUROMUSCULAR REEDUCATION: CPT

## 2019-11-11 PROCEDURE — 97140 MANUAL THERAPY 1/> REGIONS: CPT

## 2019-11-11 PROCEDURE — 99213 OFFICE O/P EST LOW 20 MIN: CPT | Performed by: FAMILY MEDICINE

## 2019-11-11 PROCEDURE — 97110 THERAPEUTIC EXERCISES: CPT

## 2019-11-11 NOTE — PROGRESS NOTES
Daily Note     Today's date: 2019  Patient name: Vianca Hankins  : 1975  MRN: [id number]  Referring provider: Timmy Mai  Dx:   Encounter Diagnosis     ICD-10-CM    1  Tendonitis of ankle M77 50                   Subjective: Pt reports that she saw her doctor today and he is referring her to a surgeon  She has an appt with the surgeon next week and plans to continue with HEP until that point  She notes improved mobility but increase in ankle pain over past few weeks  Pt is agreeable to being on hold until after appt with surgeon  Objective: See treatment diary below     Precautions: CAM boot until at least 10/28/19  Manual  10/17 10/21 10/29 10/31 11/04 11/11       L ankle PROM SH NC SP SP SP SH       STM to (L) ankle, plantar surface, achilles   SP          Reassess tolerance to IASTM and MFD with decrease in sxs  --           Bike > MHP > manuals > TE  Exercise Diary  10/17 10/21 10/29 10/31 11/04 11/11       Bike ROM 4' 5' ROM 7' ROM 5' ROM 8' ROM 7' ROM       gastroc stretch Seated w/strap 30"x3 3 x 30" /c towel  30"x3   /c towel  30"x3 /c towel  30"x3 Towel 30"x3       Ankle divya/ inver stretch 15"x5 ea np 30"x3ea 30"x3ea 30"x3ea 30"x3 ea       BAPS- AP, ML, CW/CCW L2 15x ea np L2 30ea L# 30ea  -       Seated HR/TR 20x ea 30 ea 30ea 30ea Standing  20xea -       marbles 2x 2x 4x 4x  -       Towel scrunch 30x   30x  30x       Self STM c golf ball 2' 2' 3' 3'  3'       S/L ankle inversion   20     -       S/L ankle eversion  20    -       shortfoot    3"x15         rockerboard     20x ea -       Tandem on foam     20"x2ea -       SLS  (L)     foam 10"x2 -                      Modalities  10/17 10/21 10/29 10/31 11/04 11/11       Gameready 10' low, coldest 10'  low 10' med pressure, 44 deg deferred         MHP      10' /p bike 10' pre-manuals                        Assessment: Tolerated treatment well  WB exercises held due to increase in pain and pending hold after this visit  Plan:  On hold pending appt with surgeon; pt will call to follow up after      Charlene Beckford PTA

## 2019-11-11 NOTE — LETTER
November 11, 2019     Patient: Royce Coyle   YOB: 1975   Date of Visit: 11/11/2019       To Whom it May Concern:    Royce Coyle is under my professional care  She was seen in my office on 11/11/2019  She may return to work on 11/11/2019  Recommend light duty restrictions to include sedentary work only  If you have any questions or concerns, please don't hesitate to call           Sincerely,          Jose Angel Hodges III, DO        CC: Royce Hairstoncandy

## 2019-11-11 NOTE — PROGRESS NOTES
1  Peroneal tendon tear, left, initial encounter  Ambulatory referral to Orthopedic Surgery     Orders Placed This Encounter   Procedures    Ambulatory referral to Orthopedic Surgery        Imaging Studies (I personally reviewed images in PACS and report):    Past diagnostics reports reviewed:  MRI left ankle 08/28/2019:  1  Common peroneal tenosynovitis with peroneus brevis tendinosis as well as an early split of the tendon at the level of the lateral malleolus but with an intact insertion at the 5th metatarsal base  2   Mild plantar fasciitis without evidence of tear  IMPRESSION:  Left peroneal split tear with tenosynovitis  Work injury  Date of Injury:  July 2019  Initial visit with Sports Medicine:  10/28/2019  Follow-up from injury:  4-5 months    Repeat X-ray next visit:   None      Return for Follow-up with foot ankle surgeon  Patient Instructions   Continue with sedentary work restrictions only  Continue home exercises  Follow-up with orthopedic foot ankle surgeon for 2nd opinion and consideration of invasive management          CHIEF COMPLAINT:  Follow-up right ankle injury    HPI:  Jaren Pierre is a 37 y o  female  who presents for       11/11/2019:  Here for follow-up right ankle injury which initially occurred in work July 2019  She did see orthopedic physician assistant who placed patient in Cam boot  She has continued with light duty restrictions sedentary work and recommended for form physical therapy upon follow up with Sports Medicine  Today, patient continues to have left-sided lateral ankle pain that is worsening  Her MRI previously performed did reveal split tearing of peroneal tendons  Review of Systems   Constitutional: Negative for chills and fever  Neurological: Negative for weakness and numbness  Following history reviewed and update:    History reviewed  No pertinent past medical history  History reviewed  No pertinent surgical history    Social History Social History     Substance and Sexual Activity   Alcohol Use Not on file     Social History     Substance and Sexual Activity   Drug Use Not on file     Social History     Tobacco Use   Smoking Status Never Smoker   Smokeless Tobacco Never Used     Family History   Problem Relation Age of Onset    Diabetes Mother     Diabetes Father      No Known Allergies       Physical Exam  /87   Pulse 76   Ht 5' 6" (1 676 m)   Wt 101 kg (222 lb)   BMI 35 83 kg/m²     Constitutional:  see vital signs  Gen: well-developed, normocephalic/atraumatic, well-groomed  Eyes: No inflammation or discharge of conjunctiva or lids; sclera clear   Pharynx: no inflammation, lesion, or mass of lips  Neck: supple, no masses, non-distended  MSK: no inflammation, lesion, mass, or clubbing of nails and digits except for other than mentioned below  SKIN: no visible rashes or skin lesions  Pulmonary/Chest: Effort normal  No respiratory distress     NEURO: cranial nerves grossly intact  PSYCH:  Alert and oriented to person, place, and time; recent and remote memory intact; mood normal, no depression, anxiety, or agitation, judgment and insight good and intact   Constitutional:  see vital signs      Ortho Exam    LEFT ANKLE & FOOT EXAM  Observation  GAIT:  Antalgic left    Inspection  Erythema: no  Ecchymosis: no  Edema:  none      Tenderness  Proximal Fibula: no  (Maisonneuve frx)  AiTFL: no  (2cm proximal-medial to tip lateral malleolus 92% sens, 29% spec)  ATFL: +  CFL: no  PTFL: no  Achilles:  no  deltoid: No  Peroneal: +  Tibialis Anterior: no  Tibialis Posterior: no     Bony Tenderpoints:  Lateral Malleolus: no  Base of 5th MT: no  Medial Malleolus: no  Navicular: no  Talar dome: No    ROM  Dorsiflexion: intact  Plantarflexion: intact    Muscle Strength  Pronation: intact   Supination: intact     Calcaneal Squeeze: negative    Procedures          Medical assistant Charly Ocampo present for encounter again on 11/11/2019 and provided interpretation

## 2019-11-11 NOTE — PATIENT INSTRUCTIONS
Continue with sedentary work restrictions only  Continue home exercises  Follow-up with orthopedic foot ankle surgeon for 2nd opinion and consideration of invasive management

## 2019-11-14 ENCOUNTER — APPOINTMENT (OUTPATIENT)
Dept: PHYSICAL THERAPY | Facility: CLINIC | Age: 44
End: 2019-11-14
Payer: OTHER MISCELLANEOUS

## 2019-11-18 ENCOUNTER — APPOINTMENT (OUTPATIENT)
Dept: PHYSICAL THERAPY | Facility: CLINIC | Age: 44
End: 2019-11-18
Payer: OTHER MISCELLANEOUS

## 2019-11-21 ENCOUNTER — APPOINTMENT (OUTPATIENT)
Dept: RADIOLOGY | Facility: CLINIC | Age: 44
End: 2019-11-21
Payer: OTHER MISCELLANEOUS

## 2019-11-21 ENCOUNTER — APPOINTMENT (OUTPATIENT)
Dept: PHYSICAL THERAPY | Facility: CLINIC | Age: 44
End: 2019-11-21
Payer: OTHER MISCELLANEOUS

## 2019-11-21 ENCOUNTER — ANESTHESIA EVENT (OUTPATIENT)
Dept: PERIOP | Facility: AMBULARY SURGERY CENTER | Age: 44
End: 2019-11-21
Payer: OTHER MISCELLANEOUS

## 2019-11-21 ENCOUNTER — PREP FOR PROCEDURE (OUTPATIENT)
Dept: OBGYN CLINIC | Facility: CLINIC | Age: 44
End: 2019-11-21

## 2019-11-21 ENCOUNTER — OFFICE VISIT (OUTPATIENT)
Dept: OBGYN CLINIC | Facility: CLINIC | Age: 44
End: 2019-11-21
Payer: OTHER MISCELLANEOUS

## 2019-11-21 VITALS
SYSTOLIC BLOOD PRESSURE: 153 MMHG | HEART RATE: 73 BPM | DIASTOLIC BLOOD PRESSURE: 118 MMHG | WEIGHT: 222 LBS | HEIGHT: 66 IN | BODY MASS INDEX: 35.68 KG/M2

## 2019-11-21 DIAGNOSIS — M25.572 PAIN, JOINT, ANKLE AND FOOT, LEFT: ICD-10-CM

## 2019-11-21 DIAGNOSIS — M25.572 PAIN, JOINT, ANKLE AND FOOT, LEFT: Primary | ICD-10-CM

## 2019-11-21 DIAGNOSIS — S86.312A PERONEAL TENDON TEAR, LEFT, INITIAL ENCOUNTER: ICD-10-CM

## 2019-11-21 PROCEDURE — 99213 OFFICE O/P EST LOW 20 MIN: CPT | Performed by: ORTHOPAEDIC SURGERY

## 2019-11-21 PROCEDURE — 73610 X-RAY EXAM OF ANKLE: CPT

## 2019-11-21 RX ORDER — CHLORHEXIDINE GLUCONATE 4 G/100ML
SOLUTION TOPICAL DAILY PRN
Status: CANCELLED | OUTPATIENT
Start: 2019-11-21

## 2019-11-21 NOTE — PATIENT INSTRUCTIONS
VICKIE Khan  Attending, [address] [place] [address] Office Phone: [phone] ? Fax: [phone]  [facility] Office Phone: [phone] ? LIW:[phone]    : Mikie HoustonsoRhett david    Surgery Coordinator [facility]: Dahlia Maus, [phone]  Surgery Coordinator [facility]:  [facility], [phone]  www [email or web address]   PRE-OPERATIVE AND POST-OPERATIVE INSTRUCTIONS    General Information:   Your surgery is with Dr Sandi Olszewski  Dates can change (although rare) depending on emergencies   Typical post operative visits are at the following intervals:  2-3 weeks post surgery, 6 weeks post surgery, 3 months post surgery, 6 months post surgery, and then on a yearly basis  However, this may change based on Dr Juan Alberto Akbar recommendation   #1 post-operative rule for foot/ankle surgery:  ONCE YOU ARE OUT OF YOUR CAST AND/OR REMOVABLE BOOT, SWELLING MAY PERSIST FOR MANY MONTHS  YOU MIGHT ALSO EXPERIENCE A BLUISH DISCOLORATION OF YOUR LEG  THIS IS NORMAL AND PART OF THE USUAL POSTOPERATIVE EXPERIENCE  SMOKING:   Smoking results in incomplete healing of fractures (broken bones) and joints that my have been fused  Smoking and nicotine also prevents the growth of bone into ankle replacements and bone healing  It also slows the healing of muscles and skin (soft tissue)  Therefore, please do not have surgery if you continue to smoke  We reserve the right to cancel your surgery if we suspect that you are smoking  DO NOT use nicorette gum or other patches  Please find an alternative method to quit smoking before your surgery  Pre-Operative Information:   Surgery date and preoperative visits:  a   If you have medical problems, such as an abnormal EKG, history of BLOOD CLOT, ANEURYSM, and any other heart condition, please inform us so that we can get your medical clearance several weeks before the surgery  Please bring any important medical information, such as an EKG, chest x-ray, or echocardiogram, with you to ensure that your surgery will not be delayed  b  If needed, you will receive your preoperative appointments in the mail or by phone from our scheduling office  The location of the preoperative appointment will be given to you also   c  You may not eat after midnight the night before surgery  If you do, your surgery will be cancelled  d   Carey Dockery will receive a phone call from your surgery center the day before your surgery (if your surgery is on a Monday, you will get a call the Friday before)  If you do not hear from someone by 4pm the day before your surgery, please call the Surgical coordinator (number above) to notify us   e  Start taking Vitamin D3 4000 units per day and Calcium 1200mg per day immediately  You will continue this until your 3 month post-op visit  These are over the counter and available at all pharmacies and supermarkets   Because bacterial can often enter any defect in the skin, it is important to avoid any cuts before surgery  Any breaks in the skin on the leg will often result in your surgery being postponed  Please avoid going on a very long walk the day prior to surgery, or doing other activities that could lead to irritation of the skin, including yard work, extra athletic activity, or shaving  This could result in surgery cancellation   You MUST be fasting the day of your surgery  Therefore, please do not consume any foot or beverage after midnight the night before surgery  The morning of surgery you may take your usual medications with a sip of water   It is important not to take anti-inflammatory medication like Ibuprofen, Motrin, Naproxen (Aleve), or Aspirin 7-10 days before surgery because they will make you bleed more than usual   Vitamin, E, Plavix and Coumadin also have the same effect  Stop Aspirin and Vitamin E two weeks before surgery    YOUR MEDICAL DOCTOR SHOULD TELL YOU WHEN TO STOP COUMADIN OR PLAVIX   If your surgery involves any bone healing, please do not take anti-inflammatories for at least 6 weeks after surgery  This can impede bone healing (ibuprofen, Aleve, Relafen, iodine)  Tylenol is fine to take  PREOPERATIVE BATHING INSTRUCTIONS:     Before your surgery, bathe with Hibiclens (4% Chlorhexidene) as instructed below  This skin cleanser will help reduce the bacteria on your skin before surgery  To avoid irritating your eyes, do not apply Hibiclens above the level of your neck   o On the evening before AND the morning of surgery, bathe your entire body except the face and scalp, then rinse freely  o DO NOT apply to your face or scalp, as Hibiclens can irritate your eyes   Purchasing information:   Hibiclens is available without a prescription at [facility]  ADDITIONAL INSTRUCTIONS:  PATIENTS HAVING FOOT/ANKLE SURGERY     In preparation for your upcoming surgery, we kindly request and advise the following:   Notify our office if you are taking any of the following:  Coumadin (warfarin):  Persantine (dipyridamole); Pletal (cilostazol); Plavix (clopidogrel); Ticlid (ticlopidine); Agrylin (anagrelide); Aggrenox (dipyridamole and aspirin) or other blood thinners,   In addition, stop taking Vitamin E and herbal supplements   Do not schedule any elective dental work for at least 6 months after surgery  If you had an ankle replacement, you will need to take antibiotics before any future dental procedures  Your dentist or our office can prescribe these for you  An information sheet will be given to you to give to your dentist regarding these precautions  THREE RULES:    1  After surgery you will most likely be given the instructions KEEP YOUR TOES ABOVE YOUR NOSE    This means that you MUST have your feet elevated higher than your heart    Keeping your toes above your nose helps to heal the muscles and skin (soft tissues) by reducing swelling in your leg  This position also helps to prevent infection, and is very important in avoiding deep venous thrombosis (blood clots)  2  In order to keep the blood circulating in your legs and in order to avoid deep vein   thrombosis (blood clots), we ask patients to GET UP ONCE AN HOUR during the day  This means you should at least cross the room and come back  It does not mean you have to be up for long periods of time  In most cases we will not have people immediately put any weight on their operated part  This is important to prevent loosening of metal or other devices holding the bones together  It also prevents irritation of the soft tissues which can lead to prolonged healing  When we say get up once an hour, please walk, hop or move with an assisted device  This is important! 3  Do not do any excessive walking during the first few days after surgery  Recovering from surgery is a full-time task for the patient  Postoperative care is important to avoid irritating the skin incision, which can lead to infection  Please do not plan activities or go out of town for several weeks after surgery  If you are unsure about your future activities, please schedule surgery only when you know it is acceptable for you  Scheduling surgery and then canceling the date, prevents other people from having surgery on that date as it takes time to line everything up effectively  If you cancel your surgery the week of your planned surgery, we reserve the right to cancel all future surgical procedures  THE DAY OF SURGERY:     Arrival to the hospital or outpatient surgical center on time is imperative  If you arrive late, then your surgery will be cancelled  You MUST have a family member/friend bring you, stay with you throughout the DURATION of your surgery, and drive you home   You MUST be fasting the day of your surgery    Therefore, do not consume any food or beverage after midnight the night before surgery  At your pre-operative visit with the anesthesia staff, or during your phone screen, a nurse will instruct you what medications you will need to take the day of surgery   MAKE SURE THAT THE PHARMACY LISTED IN THE ELECTRONIC MEDICAL RECORD (EPIC) IS YOUR PREFERRED PHARMACY  For example, if you are staying with family or a friend, and will not be near your preferred pharmacy, YOU MUST, tell the nurses checking you in the day of surgery so that this can be changed in the system  If your prescriptions are sent to a pharmacy, this cannot be changed  AFTER YOUR SURGERY:   Bleeding through the bandage almost always occurs  Do not let this alarm you  Simply add more gauze or a towel, call us, and come in for a dressing change  If you think it is excessive, contact us immediately or go to the local emergency room   Do not get the bandage wet  Showering is possible with plastic protectors  Be very careful, as the bathroom can be wet and slippery  If you do get your dressing wet, it should be changed immediately  Please contact us   ONCE YOUR ARE OUT OF YOUR CAST AND/OR REMOVABLE BOOT, SWELLING MAY PERSIST FOR MANY MONTHS  YOU MIGHT ALSO EXPERIENCE A BLUISH DISCOLORATION OF YOUR LEG  THIS IS NORMAL AND PART OF THE USUAL POSTOPERATIVE EXPERIENCE  WEARING COMPRESSION HOSE (ELASTIC STOCKINGS) CAN HELP AVOID SOME OF THIS SWELLING  DRESSING:   The purpose of the surgical dressing is to keep your wound and the surgical site protected from the environment  Most dressings contain splints, which help to hold your foot and ankle in a corrected position, and also allow the surgical site to heal properly  If you have a drain in place, this will need to be removed in 1-3 days after surgery  The time for the drain to be pulled will be written on your discharge instruction sheet  CAST  INSTRUCTIONS:  You may or may not get a cast following surgery  If you do, pay close attention to the following:     After application of a splint or cast, it is very important to elevate your leg for 24 to 72 hours  The injured area should be elevated well above the heart  Remember Toes above your Nose  Rest and elevation greatly reduce pain and speed the healing process by minimizing early swelling  CALL YOUR DOCTORS OFFICE OR VISIT LOCATION EMERGENCY ROOM IF YOU HAVE ANY OF THE FOLLOWING:     Significant increased pain, which may be caused by swelling, and the feeling that the splint or cast is too tight   Numbness and tingling in your hand or foot, which may be caused by too much pressure on the nerves   Burning and stinging, which may be caused by too much pressure on the skin   Excessive swelling below the cast, which may mean the cast is slowing your blood circulation   Loss of active movement of toes, which request an urgent evaluation   Loss of capillary refill  Pinch the tip of toes and greg the skin  Release pressure and if the skin does not return pink then call the office immediately  DO NOT GET YOUR CAST WET  Bacteria thrive in moist dark areas  We do not want this  If your cast becomes wet, return to the office and we will apply another one  PAIN AFTER SURGERY:  Narcotic pain medication can and will depress your respiratory system if taken in excess  The goal of pain management with narcotics is to be comfortable not pain free  If you take enough narcotics to be pain free then you run the risk of stopping breathing  If this happens, call 911 immediately!  Pain in the heel is often  caused by pressure from the weight of your foot on the bed  Make sure your heel is suspended off the bed by keeping a pillow underneath your calf not your knee  Medications: You will be given narcotic pain medication  Do NOT drive while taking narcotic medications   Medications such as Darvocet, Percocet, Vicoden or Tylenol #3, also contain acetaminophen (Tylenol)  Do not take acetaminophen or Tylenol from home when taking theses medications  When you fill your prescription, you may ask the pharmacist if your pain medication has acetaminophen/Tylenol in it  It is okay to take Tylenol with Oxycontin/Oxycodone  Should you have pain after taking your prescription medication, ibuprophen (Motrin, Advil, and Alleve) is a common over the counter preparation and may often be taken with the prescription pain medication as long as you take them with food  These medications can irritate the stomach lining  Unless you are allergic to aspirin or currently taking a blood thinner, Dr Hernandez Guevara patients are requested to take one 325 mg aspirin every 12 hours until you are back to walking normally after surgery (This can be up to 6 weeks)  Narcotic medications commonly cause nausea  Taking them with food will decrease this side effect  If you are having extreme nausea, please contact us for an alternative medication or for something that can be taken with this medication to decrease the nausea  Also, narcotic medications frequently cause constipation  An increase of fiber, fruits and vegetables in your diet may alleviate this problem, or if necessary, you may use an over-the-counter medication such as senekot, colace, or Fibercon for constipation problems  You should resume all medications you were taking prior to the surgery unless otherwise specified  Activity:   Because of your recent foot surgery, your activity level will decrease  You will need to elevate your foot ABOVE the level of your heart for a minimum of four days  The length of time necessary for the swelling to go down, and for your wounds to heal properly depends greatly on your efforts here  Elevation is extremely important to avoid compromising the blood supply to your foot  Remember when your foot is down it will swell, which will increase pain and slow healing   Wiggle your toes frequently if possible  If you go home with a regional block, (a type of anesthesia) the foot and leg will be numb  Think of ways to get into your house and around the house until the block wears off  Keep in mind that it may be a legal issue if you drive while in a cast or splint, especially when the splint is on the right foot  You may call the Department of Motor Vehicles to schedule a road test if you have adaptive equipment applied to your car  The amount of weight you are allowed to bear on your foot will be written on your discharge sheet filled out at the time of surgery  The following is an explanation of the possibilities:     Non-weight bearing: You are to put NO weight whatsoever on your foot  When using crutches or a walker, your foot should not touch the ground, except when you are standing  Then, it may rest on the ground  If you are to be non-weight bearing, and you are not compliant, you could compromise the surgery  Some of our patients have been requesting prescriptions for a roll-a-bout knee scooter  [employer] and other insurances have been denying these claims, and you may either have to rent one or pay out of pocket to purchase one

## 2019-11-21 NOTE — PROGRESS NOTES
VICKIE Schulz  Attending, Orthopaedic Surgery  [facility] [address] [facility]      ORTHOPAEDIC FOOT AND ANKLE CLINIC VISIT     Assessment:     Encounter Diagnoses   Name Primary?  Pain, joint, ankle and foot, left Yes    Peroneal tendon tear, left, initial encounter             Plan:   · The patient verbalized understanding of exam findings and treatment plan  We engaged in the shared decision-making process and treatment options were discussed at length with the patient  Surgical and conservative management discussed today along with risks and benefits  · She has pain consistent with a peroneus brevis tear seen in MRI  She has exhausted conservative treatment options including NSAIDs, activity modification, physical therapy without relief  · Discussed surgical repair of peroneus brevis tendon tear  She would like to proceed with surgery  · This procedure has been fully reviewed with the patient and written informed consent has been obtained with the assistance of an  service via phone  · Plan on  mg BID for DVT ppx post-operatively   Return in about 3 weeks (around 12/12/2019) for 3 weeks post-op visit  CONSENT FOR SOFT TISSUE PROCEDURES:   Patient understands that there is no guarantee that the surgery will relieve all of their pain and also understands that there may be a prolonged course of protected weight-bearing status required which will restrict them from driving and other activities as discussed at today's visit  Patient recognizes that there are risks with surgery including bleeding, numbness, nerve irritation, wound complications, infection, continued pain, anesthetic complications, death, failure of procedure and possible need for further surgery  The patient understands that there is no guarantee that this surgery will relieve all of Her pain and symptoms    Patient understands that there is no guarantee that they will return to full function after the procedure  Patient has provided informed consent for the procedure  History of Present Illness:   Chief Complaint:   Chief Complaint   Patient presents with    Left Ankle - Pain     Royce Coyle is a 37 y o  female who is being seen for left ankle pain  Patient is Azeri speaking only and her significant other provides interpretation  Pain is localized at lateral ankle along peroneal tendons with minimal radiating and described as sharp and severe  Patient denies numbness, tingling or radicular pain  Denies history of neuropathy  Patient does not smoke, does not have diabetes and does not take blood thinners  Patient denies family history of anesthesia complications and has not had any complications with anesthesia  Pain/symptom timing:  Worse during the day when active  Pain/symptom context:  Worse with activites and work  Pain/symptom modifying factors:  Rest makes better, activities make worse  Pain/symptom associated signs/symptoms: none    Prior treatment   · NSAIDsYes   · Injections No   · Bracing/Orthotics Yes - she has completed 5 weeks of physical therapy with no improvement in her symptoms  · Physical Therapy Yes     Orthopedic Surgical History:   See below     Past Medical, Surgical and Social History:  Past Medical History:  has no past medical history on file  Problem List: does not have any pertinent problems on file  Past Surgical History:  has no past surgical history on file  Family History: family history includes Diabetes in her father and mother  Social History:  reports that she has never smoked  She has never used smokeless tobacco  Her alcohol and drug histories are not on file  Current Medications: has a current medication list which includes the following prescription(s): ibuprofen, ketotifen, propranolol, and sumatriptan  Allergies: has No Known Allergies       Review of Systems:  General- denies fever/chills  HEENT- denies hearing loss or sore throat  Eyes- denies eye pain or visual disturbances, denies red eyes  Respiratory- denies cough or SOB  Cardio- denies chest pain or palpitations  GI- denies abdominal pain  Endocrine- denies urinary frequency  Urinary- denies pain with urination  Musculoskeletal- Negative except noted above  Skin- denies rashes or wounds  Neurological- denies dizziness or headache  Psychiatric- denies anxiety or difficulty concentrating    Physical Exam:   BP (!) 153/118 (BP Location: Left arm, Patient Position: Sitting, Cuff Size: Adult)   Pulse 73   Ht 5' 6" (1 676 m)   Wt 101 kg (222 lb)   BMI 35 83 kg/m²   General/Constitutional: No apparent distress: well-nourished and well developed  Eyes: normal ocular motion  Lymphatic: No appreciable lymphadenopathy  Respiratory: Non-labored breathing  Vascular: No edema, swelling or tenderness, except as noted in detailed exam   Integumentary: No impressive skin lesions present, except as noted in detailed exam   Neuro: No ataxia or tremors noted  Psych: Normal mood and affect, oriented to person, place and time  Appropriate affect  Musculoskeletal: Normal, except as noted in detailed exam and in HPI  Examination    Left    Gait Antalgic   Musculoskeletal Tender to palpation at peroneal tendons    Skin There is moderate swelling of the posterolateral ankle    Nails Normal    Range of Motion  20 degrees dorsiflexion, 40 degrees plantarflexion  Subtalar motion: normal    Stability Stable    Muscle Strength Patient states her ankle strength is limited by pain  4/5 tibialis anterior  4/5 gastrocnemius-soleus  4/5 posterior tibialis  4/5 peroneal/eversion strength  5/5 EHL  5/5 FHL    Neurologic Normal    Sensation Intact to light touch throughout sural, saphenous, superficial peroneal, deep peroneal and medial/lateral plantar nerve distributions  [place] 5 07 filament (10g) testing deferred      Cardiovascular Brisk capillary refill < 2 seconds,intact DP and PT pulses Special Tests None      Imaging Studies:   MRI of the left ankle/heel obtained no 8/28/2019 demonstrates tenosynovitis of the peroneal tendons with a split tear of the peroneus brevis tendon at the level of the lateral malleolus  Scribe Attestation    I,:   Maritza Moreno PA-C am acting as a scribe while in the presence of the attending physician :        I,:   Derik Gordon MD personally performed the services described in this documentation    as scribed in my presence :              Elane Krill Lachman, MD  Foot & Ankle Surgery   Department [address]      I personally performed the service  Elane Krill Lachman, MD

## 2019-11-21 NOTE — LETTER
November 21, 2019     Patient: Oc Etienne   YOB: 1975   Date of Visit: 11/21/2019       To Whom it May Concern:    Oc Etienne is under my professional care  She was seen in my office on 11/21/2019  She is scheduled to have surgery on her left foot on 11/27/2019  She will be unable to bear weight on her left foot for 6 weeks after the surgery  We will reevaluate her 3 weeks after the surgery and she may be able to return to light duty/desk duty at that time  If you have any questions or concerns, please don't hesitate to call           Sincerely,          Elma Pendleton MD        CC: No Recipients

## 2019-11-21 NOTE — H&P (VIEW-ONLY)
VICKIE Irvin  Attending, Orthopaedic Surgery  Foot and [facility] [address] [facility]      ORTHOPAEDIC FOOT AND ANKLE CLINIC VISIT     Assessment:     Encounter Diagnoses   Name Primary?  Pain, joint, ankle and foot, left Yes    Peroneal tendon tear, left, initial encounter             Plan:   · The patient verbalized understanding of exam findings and treatment plan  We engaged in the shared decision-making process and treatment options were discussed at length with the patient  Surgical and conservative management discussed today along with risks and benefits  · She has pain consistent with a peroneus brevis tear seen in MRI  She has exhausted conservative treatment options including NSAIDs, activity modification, physical therapy without relief  · Discussed surgical repair of peroneus brevis tendon tear  She would like to proceed with surgery  · This procedure has been fully reviewed with the patient and written informed consent has been obtained with the assistance of an  service via phone  · Plan on  mg BID for DVT ppx post-operatively   Return in about 3 weeks (around 12/12/2019) for 3 weeks post-op visit  CONSENT FOR SOFT TISSUE PROCEDURES:   Patient understands that there is no guarantee that the surgery will relieve all of their pain and also understands that there may be a prolonged course of protected weight-bearing status required which will restrict them from driving and other activities as discussed at today's visit  Patient recognizes that there are risks with surgery including bleeding, numbness, nerve irritation, wound complications, infection, continued pain, anesthetic complications, death, failure of procedure and possible need for further surgery  The patient understands that there is no guarantee that this surgery will relieve all of Her pain and symptoms    Patient understands that there is no guarantee that they will return to full function after the procedure  Patient has provided informed consent for the procedure  History of Present Illness:   Chief Complaint:   Chief Complaint   Patient presents with    Left Ankle - Pain     Orville Al is a 37 y o  female who is being seen for left ankle pain  Patient is Turkish speaking only and her significant other provides interpretation  Pain is localized at lateral ankle along peroneal tendons with minimal radiating and described as sharp and severe  Patient denies numbness, tingling or radicular pain  Denies history of neuropathy  Patient does not smoke, does not have diabetes and does not take blood thinners  Patient denies family history of anesthesia complications and has not had any complications with anesthesia  Pain/symptom timing:  Worse during the day when active  Pain/symptom context:  Worse with activites and work  Pain/symptom modifying factors:  Rest makes better, activities make worse  Pain/symptom associated signs/symptoms: none    Prior treatment   · NSAIDsYes   · Injections No   · Bracing/Orthotics Yes - she has completed 5 weeks of physical therapy with no improvement in her symptoms  · Physical Therapy Yes     Orthopedic Surgical History:   See below     Past Medical, Surgical and Social History:  Past Medical History:  has no past medical history on file  Problem List: does not have any pertinent problems on file  Past Surgical History:  has no past surgical history on file  Family History: family history includes Diabetes in her father and mother  Social History:  reports that she has never smoked  She has never used smokeless tobacco  Her alcohol and drug histories are not on file  Current Medications: has a current medication list which includes the following prescription(s): ibuprofen, ketotifen, propranolol, and sumatriptan  Allergies: has No Known Allergies       Review of Systems:  General- denies fever/chills  HEENT- denies hearing loss or sore throat  Eyes- denies eye pain or visual disturbances, denies red eyes  Respiratory- denies cough or SOB  Cardio- denies chest pain or palpitations  GI- denies abdominal pain  Endocrine- denies urinary frequency  Urinary- denies pain with urination  Musculoskeletal- Negative except noted above  Skin- denies rashes or wounds  Neurological- denies dizziness or headache  Psychiatric- denies anxiety or difficulty concentrating    Physical Exam:   BP (!) 153/118 (BP Location: Left arm, Patient Position: Sitting, Cuff Size: Adult)   Pulse 73   Ht 5' 6" (1 676 m)   Wt 101 kg (222 lb)   BMI 35 83 kg/m²   General/Constitutional: No apparent distress: well-nourished and well developed  Eyes: normal ocular motion  Lymphatic: No appreciable lymphadenopathy  Respiratory: Non-labored breathing  Vascular: No edema, swelling or tenderness, except as noted in detailed exam   Integumentary: No impressive skin lesions present, except as noted in detailed exam   Neuro: No ataxia or tremors noted  Psych: Normal mood and affect, oriented to person, place and time  Appropriate affect  Musculoskeletal: Normal, except as noted in detailed exam and in HPI  Examination    Left    Gait Antalgic   Musculoskeletal Tender to palpation at peroneal tendons    Skin There is moderate swelling of the posterolateral ankle    Nails Normal    Range of Motion  20 degrees dorsiflexion, 40 degrees plantarflexion  Subtalar motion: normal    Stability Stable    Muscle Strength Patient states her ankle strength is limited by pain  4/5 tibialis anterior  4/5 gastrocnemius-soleus  4/5 posterior tibialis  4/5 peroneal/eversion strength  5/5 EHL  5/5 FHL    Neurologic Normal    Sensation Intact to light touch throughout sural, saphenous, superficial peroneal, deep peroneal and medial/lateral plantar nerve distributions  [place] 5 07 filament (10g) testing deferred      Cardiovascular Brisk capillary refill < 2 seconds,intact DP and PT pulses Special Tests None      Imaging Studies:   MRI of the left ankle/heel obtained no 8/28/2019 demonstrates tenosynovitis of the peroneal tendons with a split tear of the peroneus brevis tendon at the level of the lateral malleolus  Scribe Attestation    I,:   Michael Schmitt PA-C am acting as a scribe while in the presence of the attending physician :        I,:   Jabier Cogan, MD personally performed the services described in this documentation    as scribed in my presence :              Talbert Elks Lachman, MD  Foot & Ankle Surgery   Department [address]      I personally performed the service  Talbert Elks Lachman, MD

## 2019-11-25 ENCOUNTER — APPOINTMENT (OUTPATIENT)
Dept: PHYSICAL THERAPY | Facility: CLINIC | Age: 44
End: 2019-11-25
Payer: OTHER MISCELLANEOUS

## 2019-11-25 NOTE — PRE-PROCEDURE INSTRUCTIONS
Pre-Surgery Instructions:   Medication Instructions    ibuprofen (MOTRIN) 200 mg tablet Instructed patient per Anesthesia Guidelines   ketotifen (ZADITOR) 0 025 % ophthalmic solution Instructed patient per Anesthesia Guidelines   propranolol (INDERAL) 10 mg tablet Instructed patient per Anesthesia Guidelines   SUMAtriptan (IMITREX) 25 mg tablet Instructed patient per Anesthesia Guidelines      Pre op,medications and showering instructions reviewed-Patient has hibiclens-Patient does not have crutches

## 2019-11-26 NOTE — ANESTHESIA PREPROCEDURE EVALUATION
Review of Systems/Medical History  Patient summary reviewed  Chart reviewed  No history of anesthetic complications     Cardiovascular   Pulmonary       GI/Hepatic            Endo/Other    Obesity    GYN       Hematology   Musculoskeletal       Neurology    Headaches (migraine),    Psychology           Physical Exam    Airway    Mallampati score: II  TM Distance: >3 FB  Neck ROM: full     Dental       Cardiovascular  Rhythm: regular, Rate: normal,     Pulmonary  Breath sounds clear to auscultation,     Other Findings        Anesthesia Plan  ASA Score- 2     Anesthesia Type- general and regional with ASA Monitors  Additional Monitors:   Airway Plan: LMA  Comment: Left popliteal block  Plan Factors-    Induction- intravenous  Postoperative Plan- Plan for postoperative opioid use  Planned trial extubation    Informed Consent- Anesthetic plan and risks discussed with patient  I personally reviewed this patient with the CRNA  Discussed and agreed on the Anesthesia Plan with the CRNA  Monik Adame

## 2019-11-27 ENCOUNTER — ANESTHESIA (OUTPATIENT)
Dept: PERIOP | Facility: AMBULARY SURGERY CENTER | Age: 44
End: 2019-11-27
Payer: OTHER MISCELLANEOUS

## 2019-11-27 ENCOUNTER — HOSPITAL ENCOUNTER (OUTPATIENT)
Facility: AMBULARY SURGERY CENTER | Age: 44
Setting detail: OUTPATIENT SURGERY
Discharge: HOME | End: 2019-11-27
Attending: ORTHOPAEDIC SURGERY | Admitting: ORTHOPAEDIC SURGERY
Payer: OTHER MISCELLANEOUS

## 2019-11-27 VITALS
TEMPERATURE: 97.1 F | OXYGEN SATURATION: 99 % | DIASTOLIC BLOOD PRESSURE: 88 MMHG | RESPIRATION RATE: 18 BRPM | BODY MASS INDEX: 35.36 KG/M2 | HEART RATE: 81 BPM | WEIGHT: 220 LBS | SYSTOLIC BLOOD PRESSURE: 134 MMHG | HEIGHT: 66 IN

## 2019-11-27 DIAGNOSIS — S86.312A PERONEAL TENDON TEAR, LEFT, INITIAL ENCOUNTER: Primary | ICD-10-CM

## 2019-11-27 PROCEDURE — C9290 INJ, BUPIVACAINE LIPOSOME: HCPCS | Performed by: ORTHOPAEDIC SURGERY

## 2019-11-27 PROCEDURE — 28200 REPAIR OF FOOT TENDON: CPT | Performed by: ORTHOPAEDIC SURGERY

## 2019-11-27 RX ORDER — LIDOCAINE HYDROCHLORIDE 10 MG/ML
INJECTION, SOLUTION INFILTRATION; PERINEURAL AS NEEDED
Status: DISCONTINUED | OUTPATIENT
Start: 2019-11-27 | End: 2019-11-27 | Stop reason: SURG

## 2019-11-27 RX ORDER — OXYCODONE HYDROCHLORIDE 5 MG/1
10 TABLET ORAL EVERY 4 HOURS PRN
Status: DISCONTINUED | OUTPATIENT
Start: 2019-11-27 | End: 2019-11-27 | Stop reason: HOSPADM

## 2019-11-27 RX ORDER — ONDANSETRON 2 MG/ML
4 INJECTION INTRAMUSCULAR; INTRAVENOUS ONCE AS NEEDED
Status: DISCONTINUED | OUTPATIENT
Start: 2019-11-27 | End: 2019-11-27 | Stop reason: HOSPADM

## 2019-11-27 RX ORDER — OXYCODONE HYDROCHLORIDE 5 MG/1
5 TABLET ORAL ONCE AS NEEDED
Status: COMPLETED | OUTPATIENT
Start: 2019-11-27 | End: 2019-11-27

## 2019-11-27 RX ORDER — MAGNESIUM HYDROXIDE 1200 MG/15ML
LIQUID ORAL AS NEEDED
Status: DISCONTINUED | OUTPATIENT
Start: 2019-11-27 | End: 2019-11-27 | Stop reason: HOSPADM

## 2019-11-27 RX ORDER — FENTANYL CITRATE 50 UG/ML
INJECTION, SOLUTION INTRAMUSCULAR; INTRAVENOUS AS NEEDED
Status: DISCONTINUED | OUTPATIENT
Start: 2019-11-27 | End: 2019-11-27 | Stop reason: SURG

## 2019-11-27 RX ORDER — ONDANSETRON 2 MG/ML
INJECTION INTRAMUSCULAR; INTRAVENOUS AS NEEDED
Status: DISCONTINUED | OUTPATIENT
Start: 2019-11-27 | End: 2019-11-27 | Stop reason: SURG

## 2019-11-27 RX ORDER — BUPIVACAINE HYDROCHLORIDE 2.5 MG/ML
INJECTION, SOLUTION INFILTRATION; PERINEURAL AS NEEDED
Status: DISCONTINUED | OUTPATIENT
Start: 2019-11-27 | End: 2019-11-27 | Stop reason: HOSPADM

## 2019-11-27 RX ORDER — SODIUM CHLORIDE, SODIUM LACTATE, POTASSIUM CHLORIDE, CALCIUM CHLORIDE 600; 310; 30; 20 MG/100ML; MG/100ML; MG/100ML; MG/100ML
75 INJECTION, SOLUTION INTRAVENOUS CONTINUOUS
Status: DISCONTINUED | OUTPATIENT
Start: 2019-11-27 | End: 2019-11-27 | Stop reason: HOSPADM

## 2019-11-27 RX ORDER — VANCOMYCIN HYDROCHLORIDE 1 G/20ML
INJECTION, POWDER, LYOPHILIZED, FOR SOLUTION INTRAVENOUS AS NEEDED
Status: DISCONTINUED | OUTPATIENT
Start: 2019-11-27 | End: 2019-11-27 | Stop reason: HOSPADM

## 2019-11-27 RX ORDER — CEFAZOLIN SODIUM 2 G/50ML
2000 SOLUTION INTRAVENOUS ONCE
Status: COMPLETED | OUTPATIENT
Start: 2019-11-27 | End: 2019-11-27

## 2019-11-27 RX ORDER — FENTANYL CITRATE/PF 50 MCG/ML
25 SYRINGE (ML) INJECTION
Status: DISCONTINUED | OUTPATIENT
Start: 2019-11-27 | End: 2019-11-27 | Stop reason: HOSPADM

## 2019-11-27 RX ORDER — PROPOFOL 10 MG/ML
INJECTION, EMULSION INTRAVENOUS AS NEEDED
Status: DISCONTINUED | OUTPATIENT
Start: 2019-11-27 | End: 2019-11-27 | Stop reason: SURG

## 2019-11-27 RX ORDER — MIDAZOLAM HYDROCHLORIDE 2 MG/2ML
INJECTION, SOLUTION INTRAMUSCULAR; INTRAVENOUS AS NEEDED
Status: DISCONTINUED | OUTPATIENT
Start: 2019-11-27 | End: 2019-11-27 | Stop reason: SURG

## 2019-11-27 RX ORDER — DEXAMETHASONE SODIUM PHOSPHATE 10 MG/ML
INJECTION, SOLUTION INTRAMUSCULAR; INTRAVENOUS AS NEEDED
Status: DISCONTINUED | OUTPATIENT
Start: 2019-11-27 | End: 2019-11-27 | Stop reason: SURG

## 2019-11-27 RX ORDER — BUPIVACAINE HYDROCHLORIDE 2.5 MG/ML
INJECTION, SOLUTION INFILTRATION; PERINEURAL
Status: COMPLETED | OUTPATIENT
Start: 2019-11-27 | End: 2019-11-27

## 2019-11-27 RX ORDER — OXYCODONE HYDROCHLORIDE 5 MG/1
5 TABLET ORAL EVERY 4 HOURS PRN
Qty: 30 TABLET | Refills: 0 | Status: SHIPPED | OUTPATIENT
Start: 2019-11-27 | End: 2019-12-02

## 2019-11-27 RX ORDER — CHLORHEXIDINE GLUCONATE 4 G/100ML
SOLUTION TOPICAL DAILY PRN
Status: DISCONTINUED | OUTPATIENT
Start: 2019-11-27 | End: 2019-11-27 | Stop reason: HOSPADM

## 2019-11-27 RX ORDER — ASPIRIN 325 MG
325 TABLET, DELAYED RELEASE (ENTERIC COATED) ORAL 2 TIMES DAILY
Qty: 84 TABLET | Refills: 0 | Status: SHIPPED | OUTPATIENT
Start: 2019-11-27

## 2019-11-27 RX ORDER — ONDANSETRON 4 MG/1
4 TABLET, FILM COATED ORAL EVERY 8 HOURS PRN
Qty: 20 TABLET | Refills: 0 | Status: SHIPPED | OUTPATIENT
Start: 2019-11-27

## 2019-11-27 RX ADMIN — MIDAZOLAM HYDROCHLORIDE 2 MG: 1 INJECTION, SOLUTION INTRAMUSCULAR; INTRAVENOUS at 08:58

## 2019-11-27 RX ADMIN — ONDANSETRON 4 MG: 2 INJECTION INTRAMUSCULAR; INTRAVENOUS at 10:14

## 2019-11-27 RX ADMIN — PROPOFOL 200 MG: 10 INJECTION, EMULSION INTRAVENOUS at 09:34

## 2019-11-27 RX ADMIN — CEFAZOLIN SODIUM 2000 MG: 2 SOLUTION INTRAVENOUS at 09:38

## 2019-11-27 RX ADMIN — FENTANYL CITRATE 25 MCG: 50 INJECTION, SOLUTION INTRAMUSCULAR; INTRAVENOUS at 10:43

## 2019-11-27 RX ADMIN — SODIUM CHLORIDE, SODIUM LACTATE, POTASSIUM CHLORIDE, AND CALCIUM CHLORIDE: .6; .31; .03; .02 INJECTION, SOLUTION INTRAVENOUS at 09:26

## 2019-11-27 RX ADMIN — LIDOCAINE HYDROCHLORIDE 50 MG: 10 INJECTION, SOLUTION INFILTRATION; PERINEURAL at 09:34

## 2019-11-27 RX ADMIN — BUPIVACAINE HYDROCHLORIDE 30 ML: 2.5 INJECTION, SOLUTION INFILTRATION; PERINEURAL at 09:13

## 2019-11-27 RX ADMIN — OXYCODONE HYDROCHLORIDE 5 MG: 5 TABLET ORAL at 12:15

## 2019-11-27 RX ADMIN — DEXAMETHASONE SODIUM PHOSPHATE 4 MG: 10 INJECTION, SOLUTION INTRAMUSCULAR; INTRAVENOUS at 09:46

## 2019-11-27 RX ADMIN — FENTANYL CITRATE 50 MCG: 50 INJECTION, SOLUTION INTRAMUSCULAR; INTRAVENOUS at 08:58

## 2019-11-27 RX ADMIN — FENTANYL CITRATE 50 MCG: 50 INJECTION, SOLUTION INTRAMUSCULAR; INTRAVENOUS at 09:12

## 2019-11-27 NOTE — DISCHARGE INSTRUCTIONS
Crutch Instructions   WHAT YOU NEED TO KNOW:   Crutches are tools that provide support and balance when you walk  You may need 1 or 2 crutches to help support your body weight  You may need crutches if you had surgery or an injury that affects your ability to walk  DISCHARGE INSTRUCTIONS:   How to use crutches safely:   · Support your weight with your arms and hands  Do not support your weight with your armpits  This could hurt the nerves that are in your underarms  Keep your elbow bent when the crutch is in place under your arm  · Walk slowly and carefully with crutches  Go up and down stairs and ramps slowly, and stop to rest when you feel tired  Get up slowly to a sitting or standing position  This will help prevent dizziness and fainting  Use your crutches only on firm ground  Use caution when you walk on ice or snow  Wet or waxed floors and smooth cement floors can be slippery  Watch out for small rugs or cords  How to walk with crutches:   · Place both crutches under your arms, and place your hands on the hand  of the crutches  Place your crutches slightly in front of you  · The top of the crutches should be about 2 fingers [id number] (about 1½ inches) below your armpits  Place your weight on your hands  The top of the crutches should not press into your armpits  · If you have one leg that is injured, keep it off the floor by bending your knee  · Lift the crutches and move them a step ahead of you  Put the rubber ends of the crutches firmly on the ground  Move the foot that is not injured between the crutches  Place that heel down first     · If you are using your crutches for balance, move your right foot and left crutch forward  Then move your left foot and right crutch forward  Keep walking this way  How to go up stairs with crutches:   · Face the stairs  Put the crutches close to the first step  · Push onto the crutches and put your uninjured leg on the first step      · Put your weight on your uninjured leg that is on the first step  Bring both crutches and the injured leg onto the step at the same time  · When you hold onto a railing with one arm, put both crutches under the other arm  Use the railing to help you go up stairs  How to go down stairs with crutches:   · Stand with the toes of your uninjured leg close to the edge of the step  · Bend the knee of your uninjured leg  Slowly lower both crutches along with the injured leg onto the next step  · Lean on your crutches  Slowly lower your uninjured leg onto the same step  · Place both crutches under one arm while you hold onto the railing with the other arm  How to sit in a chair with crutches:   · Turn and back up to the chair until you feel the edge of it against the back of your legs  Keep your injured leg forward  · Take your crutches out from under your arms  Sit while bending your uninjured knee  How to get up from a chair with crutches:   · Sit on the edge of your chair  · Push up with your hands using the crutches or arms of the chair  Put your weight on your uninjured foot as you get up  · Keep your injured leg bent at the knee and off the floor  Contact your healthcare provider if:   · Your crutches do not fit  · One crutch is longer than the other  · Your crutches break or get lost     · The rubber tips of your crutches are split or loose  · You get blisters or painful calluses on your hands or armpits  · Your armpit is red, sore, or has bumps or pimples  · Your arm muscles get weaker the longer you use the crutches  · You have questions or concerns about your condition or care  Return to the emergency department if:   · You have sudden numbness in a hand or arm  · Your fingers feel cold or have cramping pain    © 2017 [employer] Information is for End User's use only and may not be sold, redistributed or otherwise used for commercial purposes  All illustrations and images included in CareNotes® are the copyrighted property of A D A M , Inc  or Dario Gonzales  The above information is an  only  It is not intended as medical advice for individual conditions or treatments  Talk to your doctor, nurse or pharmacist before following any medical regimen to see if it is safe and effective for you  VICKIE Albarado  Attending, [address] [place] [address] Office Phone: [phone] ? Fax: [phone]  [address] [place] [address] Office Phone: [phone] ? JQZ:[phone]    : Radha Cherry) [place], [place]    Surgery Coordinator [facility]: Juan Alberto [facility], [phone]  Surgery Coordinator [address] [place] [address]:  Esmer Wagoner, [phone]  www [email or web address]   PRE-OPERATIVE AND POST-OPERATIVE INSTRUCTIONS    General Information:   Typical post operative visits are at the following intervals:  2-3 weeks post surgery, 6 weeks post surgery, 3 months post surgery, 6 months post surgery, and then on a yearly basis  However, this may change based on Dr Iam Pastrana recommendation   #1 post-operative rule for foot/ankle surgery:  ONCE YOU ARE OUT OF YOUR CAST AND/OR REMOVABLE BOOT, SWELLING MAY PERSIST FOR MANY MONTHS  YOU MIGHT ALSO EXPERIENCE A BLUISH DISCOLORATION OF YOUR LEG  THIS IS NORMAL AND PART OF THE USUAL POSTOPERATIVE EXPERIENCE    DO NOT WAIT UNTIL YOUR BLOCK WEARS OFF TO TAKE YOUR PAIN MEDICATION  IT TAKES A FEW DOSES OF THE PAIN MEDICATION TO REACH A THERAPEUTIC LEVEL  TAKE A TABLET PROACTIVELY BEFORE YOU HAVE ANY PAIN AND AGAIN 4 HOURS LATER SO WHEN THE BLOCK WEARS OFF, YOU ARE NOT CAUGHT OFF GUARD  SMOKING:   Smoking results in incomplete healing of fractures (broken bones) and joints that my have been fused  Smoking and nicotine also prevents the growth of bone into ankle replacements and bone healing    It also slows the healing of muscles and skin (soft tissue)  Therefore, please do not have surgery if you continue to smoke  We reserve the right to cancel your surgery if we suspect that you are smoking  DO NOT use nicorette gum or other patches  Please find an alternative method to quit smoking before your surgery and do not restart after surgery to allow for healing  THREE RULES:    1  After surgery you will most likely be given the instructions KEEP YOUR TOES ABOVE YOUR NOSE    This means that you MUST have your feet elevated higher than your heart  Keeping your toes above your nose helps to heal the muscles and skin (soft tissues) by reducing swelling in your leg  This position also helps to prevent infection, and is very important in avoiding deep venous thrombosis (blood clots)  2  In order to keep the blood circulating in your legs and in order to avoid deep vein   thrombosis (blood clots), we ask patients to GET UP ONCE AN HOUR during the day  This means you should at least cross the room and come back  It does not mean you have to be up for long periods of time  In most cases we will not have people immediately put any weight on their operated part  This is important to prevent loosening of metal or other devices holding the bones together  It also prevents irritation of the soft tissues which can lead to prolonged healing  When we say get up once an hour, please walk, hop or move with an assisted device  This is important! 3  Do not do any excessive walking during the first few days after surgery  Recovering from surgery is a full-time task for the patient  Postoperative care is important to avoid irritating the skin incision, which can lead to infection  Please do not plan activities or go out of town for several weeks after surgery  If you are unsure about your future activities, please schedule surgery only when you know it is acceptable for you    Scheduling surgery and then canceling the date, prevents other people from having surgery on that date as it takes time to line everything up effectively  If you cancel your surgery the week of your planned surgery, we reserve the right to cancel all future surgical procedures  AFTER YOUR SURGERY:   Bleeding through the bandage almost always occurs  Do not let this alarm you  Simply add more gauze or a towel, call us, and come in for a dressing change  If you think it is excessive, contact us immediately or go to the local emergency room   Do not get the bandage wet  Showering is possible with plastic protectors  Be very careful, as the bathroom can be wet and slippery  If you do get your dressing wet, it should be changed immediately  Please contact us   ONCE YOUR ARE OUT OF YOUR CAST AND/OR REMOVABLE BOOT, SWELLING MAY PERSIST FOR MANY MONTHS  YOU MIGHT ALSO EXPERIENCE A BLUISH DISCOLORATION OF YOUR LEG  THIS IS NORMAL AND PART OF THE USUAL POSTOPERATIVE EXPERIENCE  WEARING COMPRESSION HOSE (ELASTIC STOCKINGS) CAN HELP AVOID SOME OF THIS SWELLING  DRESSING:   The purpose of the surgical dressing is to keep your wound and the surgical site protected from the environment  Most dressings contain splints, which help to hold your foot and ankle in a corrected position, and also allow the surgical site to heal properly  If you have a drain in place, this will need to be removed in 1-3 days after surgery  The time for the drain to be pulled will be written on your discharge instruction sheet  CAST  INSTRUCTIONS:  You may or may not get a cast following surgery  If you do, pay close attention to the following:     After application of a splint or cast, it is very important to elevate your leg for 24 to 72 hours  The injured area should be elevated well above the heart  Remember Toes above your Nose  Rest and elevation greatly reduce pain and speed the healing process by minimizing early swelling      CALL YOUR DOCTORS OFFICE OR VISIT LOCATION EMERGENCY ROOM IF YOU HAVE ANY OF THE FOLLOWING:     Significant increased pain, which may be caused by swelling, and the feeling that the splint or cast is too tight   Numbness and tingling in your hand or foot, which may be caused by too much pressure on the nerves   Burning and stinging, which may be caused by too much pressure on the skin   Excessive swelling below the cast, which may mean the cast is slowing your blood circulation   Loss of active movement of toes, which request an urgent evaluation   Loss of capillary refill  Pinch the tip of toes and greg the skin  Release pressure and if the skin does not return pink then call the office immediately  DO NOT GET YOUR CAST WET  Bacteria thrive in moist dark areas  We do not want this  If your cast becomes wet, return to the office and we will apply another one  PAIN AFTER SURGERY:  Narcotic pain medication can and will depress your respiratory system if taken in excess  The goal of pain management with narcotics is to be comfortable not pain free  If you take enough narcotics to be pain free then you run the risk of stopping breathing  If this happens, call 911 immediately!  Pain in the heel is often  caused by pressure from the weight of your foot on the bed  Make sure your heel is suspended off the bed by keeping a pillow underneath your calf not your knee  Medications: You will be given narcotic pain medication  Do NOT drive while taking narcotic medications  Medications such as Darvocet, Percocet, Vicoden or Tylenol #3, also contain acetaminophen (Tylenol)  Do not take acetaminophen or Tylenol from home when taking theses medications  When you fill your prescription, you may ask the pharmacist if your pain medication has acetaminophen/Tylenol in it  It is okay to take Tylenol with Oxycontin/Oxycodone     Unless you are allergic to aspirin or currently taking a blood thinner, Dr Morris Fix patients are requested to take one 325 mg aspirin every 12 hours until you are back to walking normally after surgery (This can be up to 6 weeks)  Ecotrin (Enteric-coated aspirin) is more sensitive to the stomach and we recommend purchasing this instead of regular aspirin to minimize the risk of stomach irritation  Narcotic medications commonly cause nausea  Taking them with food will decrease this side effect  If you are having extreme nausea, please contact us for an alternative medication or for something that can be taken with this medication to decrease the nausea  Also, narcotic medications frequently cause constipation  An increase of fiber, fruits and vegetables in your diet may alleviate this problem, or if necessary, you may use an over-the-counter medication such as senekot, colace, or Fibercon for constipation problems  You should resume all medications you were taking prior to the surgery unless otherwise specified  If you had fracture surgery, bony surgery like an osteotomy or fusion, or a surgery that requires bone healing, you are advised to take Vitamin D and Calcium to improve healing potential   Vitamin D3 4000 units/day and Calcium 1200mg/day  These are over the counter medications so please pick them up at the pharmacy when you are picking up your prescriptions  Activity:   Because of your recent foot surgery, your activity level will decrease  You will need to elevate your foot ABOVE the level of your heart for a minimum of four days  The length of time necessary for the swelling to go down, and for your wounds to heal properly depends greatly on your efforts here  Elevation is extremely important to avoid compromising the blood supply to your foot  Remember when your foot is down it will swell, which will increase pain and slow healing  Wiggle your toes frequently if possible  If you go home with a regional block, (a type of anesthesia) the foot and leg will be numb   Think of ways to get into your house and around the house until the block wears off  Keep in mind that it may be a legal issue if you drive while in a cast or splint, especially when the splint is on the right foot  You may call the Department of Motor Vehicles to schedule a road test if you have adaptive equipment applied to your car  The amount of weight you are allowed to bear on your foot will be written on your discharge sheet filled out at the time of surgery  The following is an explanation of the possibilities:     Non-weight bearing: You are to put NO weight whatsoever on your foot  When using crutches or a walker, your foot should not touch the ground, except when you are standing  Then, it may rest on the ground  If you are to be non-weight bearing, and you are not compliant, you could compromise the surgery  Some of our patients have been requesting prescriptions for a roll-a-bout knee scooter  [employer] and other insurances have been denying these claims, and you may either have to rent one or pay out of pocket to purchase one

## 2019-11-27 NOTE — ANESTHESIA POSTPROCEDURE EVALUATION
Post-Op Assessment Note    CV Status:  Stable    Pain management: adequate     Mental Status:  Alert and awake   Hydration Status:  Euvolemic   PONV Controlled:  Controlled   Airway Patency:  Patent   Post Op Vitals Reviewed: Yes      Staff: CRNA           BP (P) 155/88 (11/27/19 1100)    Temp (!) 97 1 °F (36 2 °C) (11/27/19 1057)    Pulse (P) 78 (11/27/19 1100)   Resp (P) 18 (11/27/19 1100)    SpO2 (P) 99 % (11/27/19 1100)

## 2019-11-27 NOTE — ANESTHESIA PROCEDURE NOTES
Peripheral Block    Patient location during procedure: holding area  Start time: 11/27/2019 9:10 AM  Reason for block: at surgeon's request and post-op pain management  Staffing  Anesthesiologist: Mike Mosquera MD  Other anesthesia staff: Olga Coker MD  Preanesthetic Checklist  Completed: patient identified, site marked, surgical consent, pre-op evaluation, timeout performed, IV checked, risks and benefits discussed and monitors and equipment checked  Peripheral Block  Patient position: right lateral decubitus  Prep: ChloraPrep  Patient monitoring: heart rate, cardiac monitor, continuous pulse ox and frequent blood pressure checks  Block type: popliteal  Laterality: left  Injection technique: single-shot  Procedures: ultrasound guided, Ultrasound guidance required for the procedure to increase accuracy and safety of medication placement and decrease risk of complications   and nerve stimulator  Ultrasound permanent image savedbupivacaine (MARCAINE) 0 25 % perineural infiltration, 30 mL  Needle  Needle type: Stimuplex   Needle gauge: 22 G  Needle length: 10 cm  Needle localization: ultrasound guidance  Assessment  Injection assessment: local visualized surrounding nerve on ultrasound, negative aspiration for heme and no paresthesia on injection  Paresthesia pain: none  Heart rate change: no  Slow fractionated injection: yes  Post-procedure:  site cleaned  patient tolerated the procedure well with no immediate complications

## 2019-11-27 NOTE — OP NOTE
OPERATIVE REPORT  PATIENT NAME: Autumn Verde    :  1975  MRN: [id number]  Pt Location: [facility] OR ROOM 05    SURGERY DATE: 2019    Surgeon(s) and Role:     Edwar Reilly MD - Primary    Preop Diagnosis:  Peroneal tendon tear, left, initial encounter [S86 312A]    Post-Op Diagnosis Codes:     * Peroneal tendon tear, left, initial encounter [S86 312A]    Procedure(s) (LRB):  REPAIR TENDON FOOT, Left peroneal tendon exploration with possible tenodesis vs reconstruction vs debridement vs repair (Left)    Specimen(s):  * No specimens in log *    Estimated Blood Loss:   Minimal    Drains:  * No LDAs found *    Anesthesia Type:   Choice    Operative Indications:  Peroneal tendon tear, left, initial encounter [C16 272C]      Operative Findings:  Split tear of peroneus brevis    Complications:   None    Procedure and Technique:  A curvilinear incision was made along the lateral ankle centered over the peroneal tendons just posterior to the fibula  Upon incising the skin, the peroneal retinaculum was noted and then split longitudinally to expose the tendons  We were careful to avoid the sural nerve and we left a 1mm cuff off of the posterior fibula to ease in repair of the retinaculum  The peroneal tendons were not found to be intact, with a 4cm split in the peroneus brevis tendon  There was one segment, about 10% of the tendon, that was frayed and require debridement as it was not amenable to repair    The tendons easily reduced into the groove but we retracted them anteriorly to continue the inspection  After concluding there were no other tendon injuries, we noted a distal lying peroneus brevis muscle belly 2 5cm distal to the tip of the fibula  We minimally debrided this muscle and then debrided the frayed fibers from the peroneus brevis tear      We then used 2-0 ethibond to repair the split tear in the remaining tendon in a running fashion ensuring the knots would not encroach in the peroneal groove  The tendons were reduced and glided well with ankle ranging  The retinaculum and the periosteal flap were reinforced with Ethibond suture  The foot was then taken through an aggressive range of motion to ensure the strength of the repair  The wound was copiously irrigated  Vancomycin powder was spread in the wound bed  We then closed the wound in a layers fashion with 2-0 vicryl deep, 4-0 vicryl superficially, and then 3-0 nylon in the skin  The ankle was splinted in neutral dorsiflexion and slight hindfoot eversion         I was present for the entire procedure    Patient Disposition:  PACU     SIGNATURE: Stevphen Ganser, MD  DATE: November 27, 2019  TIME: 9:30 AM

## 2019-11-27 NOTE — INTERVAL H&P NOTE
H&P reviewed  After examining the patient I find no changes in the patients condition since the H&P had been written  Vitals:    11/27/19 0756   BP: (!) 158/102   Pulse: 80   Resp: 18   Temp: (!) 97 1 °F (36 2 °C)   SpO2: 98%     Left peroneal tendon exploration and repair/debridement/tenodesis/reconstruction as necessary

## 2019-11-29 ENCOUNTER — TELEPHONE (OUTPATIENT)
Dept: OBGYN CLINIC | Facility: HOSPITAL | Age: 44
End: 2019-11-29

## 2019-11-29 NOTE — TELEPHONE ENCOUNTER
Patient's  calling  Stated the patient's pain medication is not working  She is taking 5mg oxycodone Q4 hr  Not taking tylenol currently  Icing "only when pain is really bad"  Sensation and temperature of toes reportedly appropriate  Advised icing 20 mins on/20 mins off and elevating for 23 hours of the day, getting up hourly to prevent blood clots  Also advised adding tylenol 1000 mg Q8h for better pain control  Verbalized understanding  Advised calling should she not feel relief with the above measures  Verbalized understanding

## 2019-12-16 NOTE — ADDENDUM NOTE
Addendum  created 12/16/19 1804 by Daly Joseph MD    Diagnosis association updated, Intraprocedure Blocks edited, Sign clinical note

## 2019-12-19 ENCOUNTER — OFFICE VISIT (OUTPATIENT)
Dept: OBGYN CLINIC | Facility: CLINIC | Age: 44
End: 2019-12-19
Payer: OTHER MISCELLANEOUS

## 2019-12-19 VITALS
HEART RATE: 99 BPM | SYSTOLIC BLOOD PRESSURE: 126 MMHG | BODY MASS INDEX: 35.51 KG/M2 | DIASTOLIC BLOOD PRESSURE: 82 MMHG | HEIGHT: 66 IN

## 2019-12-19 DIAGNOSIS — S86.312D PERONEAL TENDON TEAR, LEFT, SUBSEQUENT ENCOUNTER: Primary | ICD-10-CM

## 2019-12-19 PROCEDURE — 99024 POSTOP FOLLOW-UP VISIT: CPT | Performed by: PHYSICIAN ASSISTANT

## 2019-12-19 PROCEDURE — 29405 APPL SHORT LEG CAST: CPT | Performed by: PHYSICIAN ASSISTANT

## 2019-12-19 NOTE — PROGRESS NOTES
VICKIE Gaviria  Attending, Orthopaedic Surgery  Foot and Ankle  [facility]      ORTHOPAEDIC FOOT AND ANKLE POST-OP VISIT     Procedure:     Repair of peroneus brevis tear, left foot        Date of surgery:   11/27/2019      PLAN  1  Weightbearing Status- NWB operative extremity in [facility] & [address]  2  DVT prophylaxis-  BID  3  Continue to elevate 23hrs/day getting up 1x per hour to prevent a blood clot  4  Pain control- OTC pain medication  5  RTC in 3 week(s)  6  Xrays needed next visit - no     History of Present Illness:   Chief Complaint:   Chief Complaint   Patient presents with    Left Ankle - Post-op     Bria Vaughan is a 40 y o  female who is being seen for post-operative visit for the above procedure  Pain is well controlled and the patient has successfully transitioned to OTC pain medicines  she is taking ASA 325mg BID for DVT prophylaxis  Patient has been NWB in a Splint  Review of Systems:  General- denies fever/chills  Respiratory- denies cough or SOB  Cardio- denies chest pain or palpitations  GI- denies abdominal pain  Musculoskeletal- Negative except noted above  Skin- denies rashes or wounds    Physical Exam:   /82 (BP Location: Left arm, Patient Position: Sitting, Cuff Size: Adult)   Pulse 99   Ht 5' 6" (1 676 m)   BMI 35 51 kg/m²   General/Constitutional: No apparent distress: well-nourished and well developed  Eyes: normal ocular motion  Lymphatic: No appreciable lymphadenopathy  Respiratory: Non-labored breathing  Vascular: No edema, swelling or tenderness, except as noted in detailed exam   Integumentary: No impressive skin lesions present, except as noted in detailed exam   Neuro: No ataxia or tremors noted  Psych: Normal mood and affect, oriented to person, place and time  Appropriate affect  Musculoskeletal: Normal, except as noted in detailed exam and in HPI  Examination    left        Incision Clean, dry, intact   Incision well healed  Sutures Removed this visit    Ecchymosis mild    Swelling mild    Sensation Intact to light touch throughout sural, saphenous, superficial peroneal, deep peroneal and medial/lateral plantar nerve distributions  Advance-Edith 5 07 filament (10g) testing deferred  Cardiovascular Brisk capillary refill < 2 seconds,intact DP and PT pulses    Special Tests None      Imaging Studies:   none    Cast application  Date/Time: 12/19/2019 5:37 PM  Performed by: Connor Wakefield PA-C  Authorized by: Connor Wakefield PA-C     Consent:     Consent obtained:  Verbal    Consent given by:  Patient    Risks discussed:  Discoloration, numbness and pain    Alternatives discussed:  No treatment  Pre-procedure details:     Sensation:  Normal  Procedure details:     Laterality:  Left    Location:  Ankle    Ankle:  L ankle    Strapping: no  Cast type:  Short leg    Supplies:  Cotton padding and fiberglass  Post-procedure details:     Pain:  Unchanged    Sensation:  Normal    Patient tolerance of procedure:   Tolerated well, no immediate complications          Colleen Walker PA-C  Foot & Ankle Surgery   Department [address]

## 2020-01-09 ENCOUNTER — OFFICE VISIT (OUTPATIENT)
Dept: OBGYN CLINIC | Facility: CLINIC | Age: 45
End: 2020-01-09

## 2020-01-09 VITALS
HEIGHT: 66 IN | DIASTOLIC BLOOD PRESSURE: 80 MMHG | SYSTOLIC BLOOD PRESSURE: 134 MMHG | HEART RATE: 88 BPM | BODY MASS INDEX: 35.51 KG/M2

## 2020-01-09 DIAGNOSIS — S86.312A PERONEAL TENDON TEAR, LEFT, INITIAL ENCOUNTER: Primary | ICD-10-CM

## 2020-01-09 PROCEDURE — 99024 POSTOP FOLLOW-UP VISIT: CPT | Performed by: ORTHOPAEDIC SURGERY

## 2020-01-09 NOTE — PROGRESS NOTES
VICKIE Padilla  Attending, Orthopaedic Surgery  Foot and Ankle  [facility]      ORTHOPAEDIC FOOT AND ANKLE POST-OP VISIT     Procedure:     Repair of peroneus brevis tear, left foot        Date of surgery:   11/27/19 / 6 weeks post op      PLAN  1  Weightbearing Status- WBAT  operative extremity in a high tide cam walker boot which she and her  states that she already has at home, initiate 6 week post op protocol verbal and written instructions given  2  DVT prophylaxis- Completed  3  Start physical therapy and HEP, referral given for physical therapy  4  Pain control- OTC pain medication  5  RTC in 6 weeks  6  Xrays needed next visit - no  7  Prescription given for compression stocking  8  Continue with rest, ice and elevation  9  Work note given  History of Present Illness:   Chief Complaint:   Chief Complaint   Patient presents with    Left Ankle - Post-op     Jeremiah Sparrow is a 40 y o  female who is being seen for post-operative visit for the above procedure  Pain is well controlled and the patient has successfully transitioned to OTC pain medicines  she is taking ASA 325mg BID for DVT prophylaxis  Patient has been NWB in a short leg cast       Review of Systems:  General- denies fever/chills  Respiratory- denies cough or SOB  Cardio- denies chest pain or palpitations  GI- denies abdominal pain  Musculoskeletal- Negative except noted above  Skin- denies rashes or wounds    Physical Exam:   /80 (BP Location: Left arm, Patient Position: Sitting, Cuff Size: Adult)   Pulse 88   Ht 5' 6" (1 676 m)   BMI 35 51 kg/m²   General/Constitutional: No apparent distress: well-nourished and well developed    Eyes: normal ocular motion  Lymphatic: No appreciable lymphadenopathy  Respiratory: Non-labored breathing  Vascular: No edema, swelling or tenderness, except as noted in detailed exam   Integumentary: No impressive skin lesions present, except as noted in detailed exam   Neuro: No ataxia or tremors noted  Psych: Normal mood and affect, oriented to person, place and time  Appropriate affect  Musculoskeletal: Normal, except as noted in detailed exam and in HPI  Examination    left        Incision Clean, dry, intact and healed  Sutures Previously removed  Ecchymosis none    Swelling mild    Sensation Intact to light touch throughout sural, saphenous, superficial peroneal, deep peroneal and medial/lateral plantar nerve distributions  [place]-Edith 5 07 filament (10g) testing deferred  Cardiovascular Brisk capillary refill < 2 seconds,intact DP and PT pulses    Special Tests None      Imaging Studies:   No new imaging  Scribe Attestation    I,:   Finesse Hernandez am acting as a scribe while in the presence of the attending physician :        I,:   Luke Parker MD personally performed the services described in this documentation    as scribed in my presence :            Michela Mo Lachman, MD  Foot & Ankle Surgery   Department of [address] [place] [address]      I personally performed the service  Michela Mo Lachman, MD

## 2020-01-09 NOTE — LETTER
January 9, 2020     Patient: Jamee Gruber   YOB: 1975   Date of Visit: 1/9/2020       To Whom it May Concern:    Jamee Gruber is under my professional care  She was seen in my office on 1/9/2020  She is to remain out of work until next visit x 6 weeks  If you have any questions or concerns, please don't hesitate to call           Sincerely,          Wade Kat MD        CC: Jamee Yasmani

## 2020-01-09 NOTE — PATIENT INSTRUCTIONS
3 days of partial weight bearing 50%  Then, 3 days of partial weight bearing 75%  Then, 3 days of partial weight bearing 90%  Then full weight bearing in the boot and wean your crutches/rolling walker  Then 2 weeks of weightbearing as tolerated in the CAM boot  You may begin weaning your boot and transitioning to a sneaker (1/31)  It is important to do this gradually to avoid aggravating the healing process  1  1/31, you may come out of the boot into a sneaker for 2 hours  2  2/1, you may come out of the boot into a sneaker for 4 hours,  3  The next day, you may come out of the boot into a sneaker for 6 hours  4  Continue this (adding 2 hours per day) as you tolerate  For example, if you do 6 hours out of the boot into a sneaker and your foot swells more than usual at night and it is difficult to control the discomfort, do not advance to 8 hours the next day, stay at 6 hours until you are able to tolerate it  Elevation, Ice and tylenol and staying off of it at night will be important to aide in this transition out of the boot  Swelling and soreness are normal as you begin to do more with the injured leg  May DC aspirin/lovenox, no longer needed  May shower, do not soak in a tub/pool/ocean/etc for another 4 weeks  Start PT  Scar massage- pea sized amount of lotion, massage into scar for 5 minutes each day  Compression stocking (Knee high, 20-30mm Hg) to be worn at all times

## 2020-01-21 ENCOUNTER — EVALUATION (OUTPATIENT)
Dept: PHYSICAL THERAPY | Facility: CLINIC | Age: 45
End: 2020-01-21
Payer: OTHER MISCELLANEOUS

## 2020-01-21 DIAGNOSIS — S86.312A PERONEAL TENDON TEAR, LEFT, INITIAL ENCOUNTER: ICD-10-CM

## 2020-01-21 DIAGNOSIS — Z98.890 S/P PERONEAL TENDON REPAIR: Primary | ICD-10-CM

## 2020-01-21 PROCEDURE — 97161 PT EVAL LOW COMPLEX 20 MIN: CPT | Performed by: PHYSICAL MEDICINE & REHABILITATION

## 2020-01-21 PROCEDURE — 97016 VASOPNEUMATIC DEVICE THERAPY: CPT | Performed by: PHYSICAL MEDICINE & REHABILITATION

## 2020-01-21 PROCEDURE — 97140 MANUAL THERAPY 1/> REGIONS: CPT | Performed by: PHYSICAL MEDICINE & REHABILITATION

## 2020-01-21 NOTE — PROGRESS NOTES
PT Evaluation     Today's date: 2020  Patient name: Angelita Vick  : 1975  MRN: [id number]  Referring provider: David Alvarado MD  Dx:   Encounter Diagnosis     ICD-10-CM    1  S/P peroneal tendon repair Z98 890    2  Peroneal tendon tear, left, initial encounter S86 312A Ambulatory referral to Physical Therapy       Start Time: 1700  Stop Time:   Total time in clinic (min): 55 minutes    Assessment  Assessment details: Pt is a 40 y o  female presenting to outpatient physical therapy with S/P peroneal brevis tendon repair (19)  Pt presents with pain, significantly limited passive and active range of motion, decreased strength, and decreased tolerance to activity  Pt would benefit from skilled physical therapy to address limitations and to achieve goals  Thank you for this referral    Impairments: abnormal gait, abnormal or restricted ROM, impaired balance, impaired physical strength, lacks appropriate home exercise program and pain with function  Other impairment: edema/effusion    Symptom irritability: highBarriers to therapy: Fear avoidance, poor understanding  Understanding of Dx/Px/POC: poor   Prognosis: poor    Plan  Patient would benefit from: PT eval and skilled physical therapy  Planned modality interventions: biofeedback, cryotherapy, TENS and thermotherapy: hydrocollator packs  Planned therapy interventions: abdominal trunk stabilization, joint mobilization, manual therapy, neuromuscular re-education, patient education, postural training, strengthening, stretching, therapeutic activities, therapeutic exercise, balance/weight bearing training, balance, home exercise program and gait training  Frequency: 2x week  Duration in weeks: 6  Treatment plan discussed with: patient        Subjective Evaluation    History of Present Illness  Mechanism of injury: surgery  Mechanism of injury: S/P R peroneal brevis tendon repair on 19   Pt is currently WBAT in [place], she is currently ambulating with B axillary crutches  Surgeons last note states WBAT in CAM boot, however patient states that she was told to only put 35% bodyweight on the operative LE  Sxs improved with foot elevated, she states that she has been elevating the LE for 3 hours a day  Pain  Current pain ratin  At best pain ratin  At worst pain ratin  Progression: improved    Patient Goals  Patient goals for therapy: decreased pain          Objective     Palpation   Left   Tenderness of the peroneus  Tenderness   Left Ankle/Foot   Tenderness in the Achilles insertion, fifth metatarsal base and lateral malleolus       Active Range of Motion   Left Ankle/Foot   Dorsiflexion (ke): -18 degrees   Plantar flexion: with pain  Inversion: 10 degrees with pain  Eversion: 10 degrees with pain    Passive Range of Motion   Left Ankle/Foot    Dorsiflexion (ke): -10 degrees with pain  Inversion: with pain  Eversion: with pain    Additional Passive Range of Motion Details  Voluntary muscular guarding    Swelling   Left Ankle/Foot   Figure 8: 53 cm  Malleoli: 25 5 cm      Flowsheet Rows      Most Recent Value   PT/OT G-Codes   Current Score  34   Projected Score  53             Precautions: WBAT in CAM boot, migraines      Manual              PROM L ankle             Talar PA GR II  NC            Calcaneal med/lat mobs Gr II  NC                                          Exercise Diary              BAPS             Seated HR/TR             gastroc stretch             S/L  Inversion/eversion             weightshift (biodex if needed)                                                                                                                                                                                                                    Modalities              Gameready in elevation perf   15'

## 2020-01-23 ENCOUNTER — OFFICE VISIT (OUTPATIENT)
Dept: PHYSICAL THERAPY | Facility: CLINIC | Age: 45
End: 2020-01-23
Payer: OTHER MISCELLANEOUS

## 2020-01-23 DIAGNOSIS — S86.312A PERONEAL TENDON TEAR, LEFT, INITIAL ENCOUNTER: Primary | ICD-10-CM

## 2020-01-23 DIAGNOSIS — Z98.890 S/P PERONEAL TENDON REPAIR: ICD-10-CM

## 2020-01-23 PROCEDURE — 97150 GROUP THERAPEUTIC PROCEDURES: CPT

## 2020-01-23 PROCEDURE — 97140 MANUAL THERAPY 1/> REGIONS: CPT

## 2020-01-23 PROCEDURE — 97016 VASOPNEUMATIC DEVICE THERAPY: CPT

## 2020-01-23 NOTE — PROGRESS NOTES
Daily Note     Today's date: 2020  Patient name: Angelita Vick  : 1975  MRN: [id number]  Referring provider: David Alvarado MD  Dx:   Encounter Diagnosis     ICD-10-CM    1  Peroneal tendon tear, left, initial encounter S86 312A    2  S/P peroneal tendon repair [id number]                   Subjective: Pt presents with b/l axillary crutches WBAT in CAM boot  She reports "increase in pain since before surgery"  Objective: See treatment diary below     Precautions: WBAT in CAM boot, migraines    Manual             PROM L ankle  SH           Talar PA GR II  NC            Calcaneal med/lat mobs Gr II  NC                                        Exercise Diary              BAPS- AP, ML L2  20x ea            Seated HR/TR 20x ea            gastroc stretch Strap 30"x3            S/L  Inv/ eversion Seated 30x ea            weightshift (biodex if needed) Low box R 10"x10 ea                         Toe curls Flex/ext 30x ea                                                                                            Modalities             Gameready in elevation perf   15' 15' post, coldest, low                                         Assessment: Improved PROM noted as compared to IE  Tolerated treatment fair  Patient demonstrated fatigue post treatment, exhibited good technique with therapeutic exercises and would benefit from continued PT to improve pain levels, mobility, and strength  Plan: Continue per plan of care  Progress treatment as tolerated      Son Silva PTA    *Group 4:10-4:45pm  *1:1 from 4:45-5pm  *GR from 5-5:15pm

## 2020-01-28 ENCOUNTER — OFFICE VISIT (OUTPATIENT)
Dept: PHYSICAL THERAPY | Facility: CLINIC | Age: 45
End: 2020-01-28
Payer: OTHER MISCELLANEOUS

## 2020-01-28 DIAGNOSIS — Z98.890 S/P PERONEAL TENDON REPAIR: ICD-10-CM

## 2020-01-28 DIAGNOSIS — S86.312A PERONEAL TENDON TEAR, LEFT, INITIAL ENCOUNTER: Primary | ICD-10-CM

## 2020-01-28 PROCEDURE — 97110 THERAPEUTIC EXERCISES: CPT

## 2020-01-28 PROCEDURE — 97112 NEUROMUSCULAR REEDUCATION: CPT

## 2020-01-28 PROCEDURE — 97140 MANUAL THERAPY 1/> REGIONS: CPT

## 2020-01-30 ENCOUNTER — APPOINTMENT (OUTPATIENT)
Dept: PHYSICAL THERAPY | Facility: CLINIC | Age: 45
End: 2020-01-30
Payer: OTHER MISCELLANEOUS

## 2020-02-04 ENCOUNTER — OFFICE VISIT (OUTPATIENT)
Dept: PHYSICAL THERAPY | Facility: CLINIC | Age: 45
End: 2020-02-04
Payer: OTHER MISCELLANEOUS

## 2020-02-04 DIAGNOSIS — S86.312A PERONEAL TENDON TEAR, LEFT, INITIAL ENCOUNTER: Primary | ICD-10-CM

## 2020-02-04 DIAGNOSIS — Z98.890 S/P PERONEAL TENDON REPAIR: ICD-10-CM

## 2020-02-04 PROCEDURE — 97140 MANUAL THERAPY 1/> REGIONS: CPT

## 2020-02-04 PROCEDURE — 97112 NEUROMUSCULAR REEDUCATION: CPT

## 2020-02-04 PROCEDURE — 97110 THERAPEUTIC EXERCISES: CPT

## 2020-02-04 PROCEDURE — 97530 THERAPEUTIC ACTIVITIES: CPT

## 2020-02-04 NOTE — PROGRESS NOTES
Daily Note     Today's date: 2020  Patient name: Alysa Fraga  : 1975  MRN: [id number]  Referring provider: Tory Solo MD  Dx:   Encounter Diagnosis     ICD-10-CM    1  Peroneal tendon tear, left, initial encounter S86 312A    2  S/P peroneal tendon repair Z98 [id number]                   Subjective: Pt reports decrease in pain and swelling since LV  She arrives to therapy with CAM boot and single axillary crutch  Objective: See treatment diary below     Precautions: WBAT in CAM boot, migraines    Manual           PROM L ankle  SH SH SH         Talar PA GR II  NC            Calcaneal med/lat mobs Gr II  NC                                        Exercise Diary            BAPS- AP, ML L2  20x ea L2  30x ea L2  30x ea          Seated HR/TR 20x ea 30x ea 30x ea          gastroc stretch Strap 30"x3 Strap 30"x3 Strap 30"x3          S/L  Inv/ eversion Seated 30x ea Seated 30x ea Seated 30x ea Add iso nv         weightshift (biodex if needed) Low box R 10"x10 ea 10"x10 AP & ML Biodex AP/ML 30 hits                       Toe curls Flex/ext 30x ea Flex/ext 30x ea Flex/ext 30x ea                       TB 4-way   PeachTB 20x ea                                                                Modalities           Gameready in elevation perf   15' 15' post, coldest, low                                         Assessment: Pt able to ambulate with antalgic gait and decreased LLE stance time without crutch with CAM  She tolerated treatment well  Patient demonstrated fatigue post treatment, exhibited good technique with therapeutic exercises and would benefit from continued PT to improve mobility and strength and wean from AD and CAM boot as instructed by doctor  Plan: Continue per plan of care  Progress treament per protocol      Sanket Ballesteros PTA

## 2020-02-06 ENCOUNTER — APPOINTMENT (OUTPATIENT)
Dept: PHYSICAL THERAPY | Facility: CLINIC | Age: 45
End: 2020-02-06
Payer: OTHER MISCELLANEOUS

## 2020-02-11 ENCOUNTER — APPOINTMENT (OUTPATIENT)
Dept: PHYSICAL THERAPY | Facility: CLINIC | Age: 45
End: 2020-02-11
Payer: OTHER MISCELLANEOUS

## 2020-02-11 DIAGNOSIS — Z98.890 S/P PERONEAL TENDON REPAIR: ICD-10-CM

## 2020-02-11 DIAGNOSIS — S86.312A PERONEAL TENDON TEAR, LEFT, INITIAL ENCOUNTER: Primary | ICD-10-CM

## 2020-02-18 ENCOUNTER — EVALUATION (OUTPATIENT)
Dept: PHYSICAL THERAPY | Facility: CLINIC | Age: 45
End: 2020-02-18
Payer: OTHER MISCELLANEOUS

## 2020-02-18 DIAGNOSIS — Z98.890 S/P PERONEAL TENDON REPAIR: ICD-10-CM

## 2020-02-18 DIAGNOSIS — S86.312A PERONEAL TENDON TEAR, LEFT, INITIAL ENCOUNTER: Primary | ICD-10-CM

## 2020-02-18 PROCEDURE — 97164 PT RE-EVAL EST PLAN CARE: CPT | Performed by: PHYSICAL MEDICINE & REHABILITATION

## 2020-02-18 PROCEDURE — 97016 VASOPNEUMATIC DEVICE THERAPY: CPT | Performed by: PHYSICAL MEDICINE & REHABILITATION

## 2020-02-18 PROCEDURE — 97140 MANUAL THERAPY 1/> REGIONS: CPT | Performed by: PHYSICAL MEDICINE & REHABILITATION

## 2020-02-18 PROCEDURE — 97112 NEUROMUSCULAR REEDUCATION: CPT | Performed by: PHYSICAL MEDICINE & REHABILITATION

## 2020-02-18 PROCEDURE — 97110 THERAPEUTIC EXERCISES: CPT | Performed by: PHYSICAL MEDICINE & REHABILITATION

## 2020-02-18 NOTE — PROGRESS NOTES
PT Re-Evaluation     Today's date: 2020  Patient name: Cabrera Ibrahim  : 1975  MRN: [id number]  Referring provider: Paula Lucas MD  Dx:   Encounter Diagnosis     ICD-10-CM    1  Peroneal tendon tear, left, initial encounter S86 312A    2  S/P peroneal tendon repair Z98 890        Start Time: 1600  Stop Time: 028  Total time in clinic (min): 70 minutes    Assessment  Assessment details: Deb Sue has been attending outpatient physical therapy S/P peroneal tendon repair on 19  Since the last assessment, patient demonstrates decreased pain levels, improved range of motion, improved strength, reduction in edema/effusion and increased tolerance to activity  Pt continues to present with pain, demonstrate decreased range of motion, decreased strength, and decreased tolerance to activity  Since initial evaluation on 20 the pt has attended 3 OPPT treatments, she has cancelled 3 times and no showed once  Pt would benefit from continued skilled physical therapy to address limitations and to achieve goals   Thank you for this referral      Impairments: abnormal gait, abnormal or restricted ROM, impaired balance, impaired physical strength, lacks appropriate home exercise program and pain with function  Other impairment: edema/effusion    Symptom irritability: moderateBarriers to therapy: Fear avoidance, poor understanding  Understanding of Dx/Px/POC: poor   Prognosis: poor    Plan  Patient would benefit from: PT eval and skilled physical therapy  Planned modality interventions: biofeedback, cryotherapy, TENS and thermotherapy: hydrocollator packs  Planned therapy interventions: abdominal trunk stabilization, joint mobilization, manual therapy, neuromuscular re-education, patient education, postural training, strengthening, stretching, therapeutic activities, therapeutic exercise, balance/weight bearing training, balance, home exercise program and gait training  Frequency: 2x week  Duration in weeks: 6  Treatment plan discussed with: patient        Subjective Evaluation    History of Present Illness  Mechanism of injury: surgery  Mechanism of injury: S/P R peroneal brevis tendon repair on 19  Pt is currently WBAT in CAM boot, she is currently ambulating with B axillary crutches  Surgeons last note states WBAT in CAM boot, however patient states that she was told to only put 35% bodyweight on the operative LE  Sxs improved with foot elevated, she states that she has been elevating the LE for 3 hours a day  (20) Pt reports reduction in pain since IE  She has been compliant with CAM boot and performing HEP  She has a follow up with referring physician on 20  Pain  Current pain ratin  At best pain ratin  At worst pain ratin  Progression: improved    Patient Goals  Patient goals for therapy: decreased pain          Objective     Palpation   Left   Tenderness of the peroneus  Tenderness   Left Ankle/Foot   Tenderness in the lateral malleolus  No tenderness in the Achilles insertion and fifth metatarsal base       Active Range of Motion   Left Ankle/Foot   Dorsiflexion (ke): -9 degrees   Plantar flexion: 35 degrees with pain  Inversion: 20 degrees with pain  Eversion: 14 degrees with pain    Passive Range of Motion   Left Ankle/Foot    Dorsiflexion (ke): -4 degrees with pain  Plantar flexion: 42 degrees with pain  Inversion: 22 degrees with pain  Eversion: 20 degrees with pain    Additional Passive Range of Motion Details  Voluntary muscular guarding in all directions    Swelling   Left Ankle/Foot   Figure 8: 52 3 cm  Malleoli: 25 5 cm      Flowsheet Rows      Most Recent Value   PT/OT G-Codes   Current Score  49   Projected Score  53          Precautions: WBAT in CAM boot, migraines     Manual   2              PROM L ankle   SH SH SH NC             Talar PA GR II  NC       NC  Gr III             Calcaneal med/lat mobs Gr II  NC       NC  Gr III/IV                                                                 Exercise Diary  1/23 1/28 2/4 2/18               BAPS- AP, ML L2  20x ea L2  30x ea L2  30x ea L3   2 5#  30 ea               Seated HR/TR 20x ea 30x ea 30x ea  30ea               gastroc stretch Strap 30"x3 Strap 30"x3 Strap 30"x3  Strap   3 x 30"               S/L  Inv/ eversion Seated 30x ea Seated 30x ea Seated 30x ea Add iso nv               weightshift (biodex if needed) Low box R 10"x10 ea 10"x10 AP & ML Biodex AP/ML 30 hits                                         Toe curls Flex/ext 30x ea Flex/ext 30x ea Flex/ext 30x ea                                         TB 4-way     PeachTB 20x ea  OTB                                                                                                                  Modalities  1/21 1/23 1/28 2/4 2/18             Gameready in elevation perf   15' 15' post, coldest, low      10'

## 2020-02-20 ENCOUNTER — OFFICE VISIT (OUTPATIENT)
Dept: OBGYN CLINIC | Facility: CLINIC | Age: 45
End: 2020-02-20

## 2020-02-20 VITALS
HEART RATE: 100 BPM | HEIGHT: 66 IN | BODY MASS INDEX: 35.51 KG/M2 | SYSTOLIC BLOOD PRESSURE: 142 MMHG | DIASTOLIC BLOOD PRESSURE: 90 MMHG

## 2020-02-20 DIAGNOSIS — S86.312A PERONEAL TENDON TEAR, LEFT, INITIAL ENCOUNTER: Primary | ICD-10-CM

## 2020-02-20 PROCEDURE — 99024 POSTOP FOLLOW-UP VISIT: CPT | Performed by: ORTHOPAEDIC SURGERY

## 2020-02-20 NOTE — PROGRESS NOTES
VICKIE Coppola  Attending, Orthopaedic Surgery  Foot and Ankle  [facility]      ORTHOPAEDIC FOOT AND ANKLE POST-OP VISIT     Procedure:     Repair of peroneus brevis tear, left foot        Date of surgery:   11/27/19      PLAN  1  Weightbearing Status- WBAT operative extremity in a stiff soled sneaker, discontinue cam walker boot  2  DVT prophylaxis- completed  3  Continue physical therapy  4  Pain control- OTC pain medication  5  RTC in 6 weeks  6  Xrays needed next visit - no     Garth Rash has been non compliant with weaning out of cam walker boot and physical therapy  She has done 3 sessions of physical therapy up to today's visit  She was advised to do 2 session per week of physical therapy over the next 6 weeks  She is to discontinue high tide cam walker boot into a stiff soled sneaker as of today  She will be seen back in the office x 6 weeks for a re-evaluation of the left ankle  History of Present Illness:   Chief Complaint:   Chief Complaint   Patient presents with    Left Ankle - Post-op     Felix Ragsdale is a 40 y o  female who is being seen for post-operative visit for the above procedure  Pain is well controlled and the patient has successfully transitioned to OTC pain medicines  She completed ASA 325mg BID for DVT prophylaxis  Patient has been WBAT in a cam boot  Review of Systems:  General- denies fever/chills  Respiratory- denies cough or SOB  Cardio- denies chest pain or palpitations  GI- denies abdominal pain  Musculoskeletal- Negative except noted above  Skin- denies rashes or wounds    Physical Exam:   /90 (BP Location: Right arm, Patient Position: Sitting, Cuff Size: Adult)   Pulse 100   Ht 5' 6" (1 676 m)   BMI 35 51 kg/m²   General/Constitutional: No apparent distress: well-nourished and well developed    Eyes: normal ocular motion  Lymphatic: No appreciable lymphadenopathy  Respiratory: Non-labored breathing  Vascular: No edema, swelling or tenderness, except as noted in detailed exam   Integumentary: No impressive skin lesions present, except as noted in detailed exam   Neuro: No ataxia or tremors noted  Psych: Normal mood and affect, oriented to person, place and time  Appropriate affect  Musculoskeletal: Normal, except as noted in detailed exam and in HPI  Examination    left        Incision Clean, dry, intact  Sutures Previously removed  Ecchymosis none    Swelling mild    Sensation Intact to light touch throughout sural, saphenous, superficial peroneal, deep peroneal and medial/lateral plantar nerve distributions  [place]-Edith 5 07 filament (10g) testing deferred  Cardiovascular Brisk capillary refill < 2 seconds,intact DP and PT pulses    Special Tests None      Imaging Studies:   No new imaging  Scribe Attestation    I,:   Lucyann Cogan am acting as a scribe while in the presence of the attending physician :        I,:   Nena Martinez MD personally performed the services described in this documentation    as scribed in my presence :            Albertine Pedro Lachman, MD  Foot & Ankle Surgery   Department of [address] [place] [address]      I personally performed the service  Albertine Pedro Lachman, MD

## 2020-02-20 NOTE — LETTER
February 20, 2020     Patient: Kenyetta Rico   YOB: 1975   Date of Visit: 2/20/2020       To Whom it May Concern:    Kenyetta Rico is under my professional care  She was seen in my office on 2/20/2020  She is to remain out of work until next visit in 6 weeks  If you have any questions or concerns, please don't hesitate to call           Sincerely,          Jabier Cogan, MD        CC: Kenyetta Medinaalcira

## 2020-02-25 ENCOUNTER — OFFICE VISIT (OUTPATIENT)
Dept: PHYSICAL THERAPY | Facility: CLINIC | Age: 45
End: 2020-02-25
Payer: OTHER MISCELLANEOUS

## 2020-02-25 DIAGNOSIS — S86.312A PERONEAL TENDON TEAR, LEFT, INITIAL ENCOUNTER: Primary | ICD-10-CM

## 2020-02-25 DIAGNOSIS — Z98.890 S/P PERONEAL TENDON REPAIR: ICD-10-CM

## 2020-02-25 PROCEDURE — 97140 MANUAL THERAPY 1/> REGIONS: CPT | Performed by: PHYSICAL MEDICINE & REHABILITATION

## 2020-02-25 PROCEDURE — 97110 THERAPEUTIC EXERCISES: CPT

## 2020-02-25 PROCEDURE — 97140 MANUAL THERAPY 1/> REGIONS: CPT

## 2020-02-25 NOTE — PROGRESS NOTES
Daily Note     Today's date: 2020  Patient name: Luna Sandhu  : 1975  MRN: [id number]  Referring provider: Dayna Garcia MD  Dx:   Encounter Diagnosis     ICD-10-CM    1  Peroneal tendon tear, left, initial encounter S86 312A    2  S/P peroneal tendon repair Z98 890                   Subjective: Pt reports improved pain levels and mobility over past few weeks and states that her MD discharged her from the CAM boot to a firm-soled sneaker  Objective: See treatment diary below     Precautions: migraines    Manual         PROM L ankle  SH SH SH  SH       Talar PA GR II  NC     Gr III, Iv  NC       Calcaneal med/lat mobs Gr II  NC     Gr III, IV  NC       Talar distraction      NC            RE          Exercise Diary         bike      5'       BAPS- AP, ML  L2  20x ea L2  30x ea L2  30x ea  L2  30x ea       Seated HR/TR  20x ea 30x ea 30x ea  standing 20x ea       gastroc stretch  Strap 30"x3 Strap 30"x3 Strap 30"x3  standing 30"x3       S/L  Inv/ eversion  Seated 30x ea Seated 30x ea Seated 30x ea  Add iso nv       weightshift (biodex if needed)  Low box R 10"x10 ea 10"x10 AP & ML Biodex AP/ML 30 hits  Biodex AP/ML 30x ea wider       Biodex LOS      2x       Rockerboard AP/ML      20x ea       Toe curls  Flex/ext 30x ea Flex/ext 30x ea Flex/ext 30x ea  D/C       Tandem stance      Foam 20"x3 ea       TB 4-way    PeachTB 20x ea  OTB 20x ea                                                             Modalities             Gameready in elevation perf   15' 15' post, coldest, low                                         Assessment: Tolerated treatment well  Patient demonstrated fatigue post treatment, exhibited good technique with therapeutic exercises and would benefit from continued PT to address mobility deficits, particularly inversion, as well as improve strength  Plan: Continue per plan of care    Progress treatment as tolerated      Jacinto Parks, PTA    *Unsupervised 4:10-4:20pm  *1:1 with NC, PT from 4:20-4:30pm

## 2020-02-27 ENCOUNTER — OFFICE VISIT (OUTPATIENT)
Dept: PHYSICAL THERAPY | Facility: CLINIC | Age: 45
End: 2020-02-27
Payer: OTHER MISCELLANEOUS

## 2020-02-27 DIAGNOSIS — S86.312A PERONEAL TENDON TEAR, LEFT, INITIAL ENCOUNTER: Primary | ICD-10-CM

## 2020-02-27 DIAGNOSIS — Z98.890 S/P PERONEAL TENDON REPAIR: ICD-10-CM

## 2020-02-27 PROCEDURE — 97112 NEUROMUSCULAR REEDUCATION: CPT

## 2020-02-27 PROCEDURE — 97110 THERAPEUTIC EXERCISES: CPT

## 2020-02-27 PROCEDURE — 97530 THERAPEUTIC ACTIVITIES: CPT

## 2020-02-27 PROCEDURE — 97140 MANUAL THERAPY 1/> REGIONS: CPT

## 2020-02-27 NOTE — PROGRESS NOTES
Daily Note     Today's date: 2020  Patient name: Luna Sandhu  : 1975  MRN: [id number]  Referring provider: Dayna Garcia MD  Dx:   Encounter Diagnosis     ICD-10-CM    1  Peroneal tendon tear, left, initial encounter S86 312A    2  S/P peroneal tendon repair Z98 890        Start Time:   Stop Time:   Total time in clinic (min): 60 minutes  Subjective: Pt reports (L) ankle pain night > day; little improvement in pain Sx since LV  Pt arrives to today's tx /s CAM boot & /c slide-on sandals nirmal Mann MD discharged her from the CAM boot to a firm-soled sneaker  Pt states she is using some ice and some heat at home for pain/swelling  Objective: See treatment diary below     Precautions: migraines  Manual        PROM L ankle  SH SH SH  SH SP      Talar PA GR II  NC     Gr III, Iv  NC       Calcaneal med/lat mobs Gr II  NC     Gr III, IV  NC       Talar distraction      NC       MRE ankle 4 ways     RE  [facility]        Exercise Diary        bike      5' 6'      gastroc stretch  Strap 30"x3 Strap 30"x3 Strap 30"x3  standing 30"x3 Pro 20"x5      S/L  Inv/ eversion  Seated 30x ea Seated 30x ea Seated 30x ea  Add iso nv       weightshift (biodex if needed)  Low box R 10"x10 ea 10"x10 AP & ML Biodex AP/ML 30 hits  Biodex AP/ML 30x ea wider       Biodex LOS      2x       Rockerboard AP/ML      20x ea 30ea      Toe curls  Flex/ext 30x ea Flex/ext 30x ea Flex/ext 30x ea  D/C       Tandem stance      Foam 20"x3 ea Foam 30"x2ea      TB 4-way    PeachTB 20x ea  OTB 20x ea OTB 30ea      Hip 3way (B/L)       10ea      Standing HR/TR       20ea                                  Modalities             Gameready in elevation perf   15' 15' post, coldest, low                                         Assessment: During MRE (L) ankle ROM, pt demonstrated good strength in inv, PF, & DF; however, significant weakness present during eversion    Pt tolerated light PROM of ankle secondary to pain - mostly /c eversion  Tolerated treatment well  Discussed progressing pt to increased time in [address] on (L)LE - pt verbalized understanding  Educated pt on use of ice/heat and when it is appropriate - pt verbalized understanding  Patient demonstrated fatigue post treatment, exhibited good technique with therapeutic exercises and would benefit from continued PT to address (L) ankle mobility, strength, and tolerance to activity and WB'ing  Plan: Continue per plan of care  Progress treatment as tolerated      Bhaskar Reynoso, PTA

## 2020-03-03 ENCOUNTER — OFFICE VISIT (OUTPATIENT)
Dept: PHYSICAL THERAPY | Facility: CLINIC | Age: 45
End: 2020-03-03
Payer: OTHER MISCELLANEOUS

## 2020-03-03 DIAGNOSIS — Z98.890 S/P PERONEAL TENDON REPAIR: Primary | ICD-10-CM

## 2020-03-03 DIAGNOSIS — S86.312A PERONEAL TENDON TEAR, LEFT, INITIAL ENCOUNTER: ICD-10-CM

## 2020-03-03 PROCEDURE — 97112 NEUROMUSCULAR REEDUCATION: CPT

## 2020-03-03 PROCEDURE — 97140 MANUAL THERAPY 1/> REGIONS: CPT

## 2020-03-03 PROCEDURE — 97530 THERAPEUTIC ACTIVITIES: CPT

## 2020-03-03 PROCEDURE — 97110 THERAPEUTIC EXERCISES: CPT

## 2020-03-03 NOTE — PROGRESS NOTES
Daily Note     Today's date: 3/3/2020  Patient name: Maame Marie  : 1975  MRN: [id number]  Referring provider: Evelyne Law MD  Dx:   Encounter Diagnosis     ICD-10-CM    1  S/P peroneal tendon repair Z98 890    2  Peroneal tendon tear, left, initial encounter S86 312A        Start Time: 1635  Stop Time: 1720  Total time in clinic (min): 45 minutes  Subjective: Pt reports (L) ankle pain night > day; little improvement in pain Sx since LV  Pt arrives to today's tx /c sneakers donned  Objective: See treatment diary below     Precautions: migraines  Manual  1/21 1/23 1/28 2/4 2/18 2/24 2/27 3/03     PROM L ankle  SH SH SH  SH SP SP     Talar PA GR II  NC     Gr III, Iv  NC       Calcaneal med/lat mobs Gr II  NC     Gr III, IV  NC       Talar distraction      NC       MRE ankle 4 ways     RE  SP Sp       Exercise Diary   2/4 2/18 2/24 2/27 3/03     bike      5' 6' 6'      gastroc stretch  Strap 30"x3 Strap 30"x3 Strap 30"x3  standing 30"x3 Pro 20"x5 Pro 30"x3     S/L  Inv/ eversion  Seated 30x ea Seated 30x ea Seated 30x ea  Add iso nv       weightshift (biodex if needed)  Low box R 10"x10 ea 10"x10 AP & ML Biodex AP/ML 30 hits  Biodex AP/ML 30x ea wider       Biodex LOS      2x       Rockerboard AP/ML      20x ea 30ea 30ea     Toe curls  Flex/ext 30x ea Flex/ext 30x ea Flex/ext 30x ea  D/C       Tandem stance      Foam 20"x3 ea Foam 30"x2ea Foam 30"x2ea     TB 4-way    PeachTB 20x ea  OTB 20x ea OTB 30ea GTB 20ea     Hip 3way (B/L)       10ea 15ea     Standing HR/TR       20ea 25ea                                 Modalities             Gameready in elevation perf   15' 15' post, coldest, low                                         Assessment: Pt continues to walk /c slow, antalgic gait within clinic and demonstrates hesitation do increase WB'ing onto (L)LE and reluctance to perform tandem stance on foam without UE support secondary to fear of pain or LOB    Patient demonstrated fatigue post treatment, exhibited good technique with therapeutic exercises and would benefit from continued PT to address (L) ankle mobility, strength, and tolerance to activity and WB'ing  Plan: Continue per plan of care  Progress treatment as tolerated      Silverio Vincent, PTA

## 2020-03-05 ENCOUNTER — OFFICE VISIT (OUTPATIENT)
Dept: PHYSICAL THERAPY | Facility: CLINIC | Age: 45
End: 2020-03-05
Payer: OTHER MISCELLANEOUS

## 2020-03-05 DIAGNOSIS — S86.312A PERONEAL TENDON TEAR, LEFT, INITIAL ENCOUNTER: ICD-10-CM

## 2020-03-05 DIAGNOSIS — Z98.890 S/P PERONEAL TENDON REPAIR: Primary | ICD-10-CM

## 2020-03-05 PROCEDURE — 97140 MANUAL THERAPY 1/> REGIONS: CPT

## 2020-03-05 PROCEDURE — 97110 THERAPEUTIC EXERCISES: CPT

## 2020-03-05 PROCEDURE — 97112 NEUROMUSCULAR REEDUCATION: CPT

## 2020-03-05 PROCEDURE — 97530 THERAPEUTIC ACTIVITIES: CPT

## 2020-03-05 NOTE — PROGRESS NOTES
Daily Note     Today's date: 3/5/2020  Patient name: Lucy Logan  : 1975  MRN: [id number]  Referring provider: Sheldon Laboy MD  Dx:   Encounter Diagnosis     ICD-10-CM    1  S/P peroneal tendon repair Z98 890    2  Peroneal tendon tear, left, initial encounter S82 374A                   Subjective: Pt reports that she has been feeling better lately and denies pain throughout session  Objective: See treatment diary below     Precautions: migraines  Manual  1/21 1/23 1/28 2/4 2/18 2/24 2/27 3/03 3/    PROM L ankle  SH SH SH  SH SP SP SH    Talar PA GR II  NC     Gr III, Iv  NC       Calcaneal med/lat mobs Gr II  NC     Gr III, IV  NC       Talar distraction      NC       MRE ankle 4 ways     RE  SP Sp [facility]      Exercise Diary   2/4 2/18 2/24 2/27 3/03 3    bike      5' 6' 6'  6'    gastroc stretch  Strap 30"x3 Strap 30"x3 Strap 30"x3  standing 30"x3 Pro 20"x5 Pro 30"x3 Pro 30"x3    S/L  Inv/ eversion  Seated 30x ea Seated 30x ea Seated 30x ea  Add iso nv   -    weightshift (biodex if needed)  Low box R 10"x10 ea 10"x10 AP & ML Biodex AP/ML 30 hits  Biodex AP/ML 30x ea wider   -    Biodex LOS      2x   -    Rockerboard AP/ML      20x ea 30ea 30ea 30x ea    Toe curls  Flex/ext 30x ea Flex/ext 30x ea Flex/ext 30x ea  D/C   -    Tandem stance      Foam 20"x3 ea Foam 30"x2ea Foam 30"x2ea Foam 20"x3 ea    TB 4-way    PeachTB 20x ea  OTB 20x ea OTB 30ea GTB 20ea GTB  30x ea    Hip 3way (B/L)       10ea 15ea     Standing HR/TR       20ea 25ea 30x ea    3-way star drill         HHA,   3 taps,   3 laps    rockerboard         AP/ML 30x ea      Modalities             Gameready in elevation perf   15' 15' post, coldest, low                                         Assessment: Tolerated treatment well  Patient demonstrated fatigue post treatment, exhibited good technique with therapeutic exercises and would benefit from continued PT to improve strength, stability and mobility      Plan: Continue per plan of care  Progress treatment as tolerated      Charlene Beckford, BRANDIN

## 2020-03-10 ENCOUNTER — APPOINTMENT (OUTPATIENT)
Dept: PHYSICAL THERAPY | Facility: CLINIC | Age: 45
End: 2020-03-10
Payer: OTHER MISCELLANEOUS

## 2020-03-12 ENCOUNTER — OFFICE VISIT (OUTPATIENT)
Dept: PHYSICAL THERAPY | Facility: CLINIC | Age: 45
End: 2020-03-12
Payer: OTHER MISCELLANEOUS

## 2020-03-12 DIAGNOSIS — Z98.890 S/P PERONEAL TENDON REPAIR: Primary | ICD-10-CM

## 2020-03-12 DIAGNOSIS — S86.312A PERONEAL TENDON TEAR, LEFT, INITIAL ENCOUNTER: ICD-10-CM

## 2020-03-12 PROCEDURE — 97112 NEUROMUSCULAR REEDUCATION: CPT

## 2020-03-12 PROCEDURE — 97110 THERAPEUTIC EXERCISES: CPT

## 2020-03-12 PROCEDURE — 97016 VASOPNEUMATIC DEVICE THERAPY: CPT

## 2020-03-12 PROCEDURE — 97530 THERAPEUTIC ACTIVITIES: CPT

## 2020-03-12 NOTE — PROGRESS NOTES
Daily Note     Today's date: 3/12/2020  Patient name: Paola Mejia  : 1975  MRN: [id number]  Referring provider: Micheal Vidales MD  Dx:   Encounter Diagnosis     ICD-10-CM    1  S/P peroneal tendon repair Z98 890    2  Peroneal tendon tear, left, initial encounter S86 312A      Start Time: 1630  Stop Time: 1710  Total time in clinic (min): 40 minutes   Pt supervised by PT, [address] [phone]  Subjective: Pt reports she is feeling "better" than last visit (sick earlier this week); however, reports elevated pain levels in (L) foot stating, "it's worse and all of the time "   Pt ambulates /c antalgic gait into today's apt        Objective: See treatment diary below     Precautions: migraines  Manual   2/4 2/18 2/24 2/27 3/03 3/5 3/12   PROM L ankle  SH SH SH  SH SP SP SH    Talar PA GR II  NC     Gr III, Iv  NC       Calcaneal med/lat mobs Gr II  NC     Gr III, IV  NC       Talar distraction      NC       MRE ankle 4 ways     RE  SP Sp [address]      Exercise Diary   2/4 2/18 2/24 2/27 3/03 3/5 3/12   bike      5' 6' 6'  6' L1 7'    gastroc stretch  Strap 30"x3 Strap 30"x3 Strap 30"x3  standing 30"x3 Pro 20"x5 Pro 30"x3 Pro 30"x3 Slant 20"x5   S/L  Inv/ eversion  Seated 30x ea Seated 30x ea Seated 30x ea  Add iso nv   -    weightshift (biodex if needed)  Low box R 10"x10 ea 10"x10 AP & ML Biodex AP/ML 30 hits  Biodex AP/ML 30x ea wider   -    Biodex LOS      2x   -    Rockerboard AP/ML      20x ea 30ea 30ea 30x ea 30ea   Toe curls  Flex/ext 30x ea Flex/ext 30x ea Flex/ext 30x ea  D/C   -    Tandem stance      Foam 20"x3 ea Foam 30"x2ea Foam 30"x2ea Foam 20"x3 ea Foam 20"x2ea   TB 4-way    PeachTB 20x ea  OTB 20x ea OTB 30ea GTB 20ea GTB  30x ea    Hip 3way (B/L)       10ea 15ea  20ea   Standing HR/TR       20ea 25ea 30x ea 30ea   3-way star drill         HHA,   3 taps,   3 laps      Modalities  1/21 1/23        3/12   Gameready in elevation perf   15' 15' post, coldest, low        40*, 10' Light pressure                                 Assessment: Tolerated treatment fair  Pt continues to demonstrate fearful movement and hesitation to fully WB through (L)LE secondary to fear of pain  Tx not progressed secondary to pt reports of elevated pain and low tolerance to activity  Patient demonstrated fatigue post treatment, exhibited good technique with therapeutic exercises and would benefit from continued PT to improve (L) ankle pain Sx, strength, stability, mobility and quality of movement  Plan: Continue per plan of care  Progress treatment as tolerated      Elbert Avalos, PTA

## 2020-03-17 ENCOUNTER — OFFICE VISIT (OUTPATIENT)
Dept: PHYSICAL THERAPY | Facility: CLINIC | Age: 45
End: 2020-03-17
Payer: OTHER MISCELLANEOUS

## 2020-03-17 DIAGNOSIS — Z98.890 S/P PERONEAL TENDON REPAIR: Primary | ICD-10-CM

## 2020-03-17 DIAGNOSIS — S86.312A PERONEAL TENDON TEAR, LEFT, INITIAL ENCOUNTER: ICD-10-CM

## 2020-03-17 PROCEDURE — 97110 THERAPEUTIC EXERCISES: CPT

## 2020-03-17 PROCEDURE — 97112 NEUROMUSCULAR REEDUCATION: CPT

## 2020-03-17 PROCEDURE — 97530 THERAPEUTIC ACTIVITIES: CPT

## 2020-03-17 NOTE — PROGRESS NOTES
Daily Note     Today's date: 3/17/2020  Patient name: Autumn Verde  : 1975  MRN: [id number]  Referring provider: Shawn Barrett MD  Dx:   Encounter Diagnosis     ICD-10-CM    1  S/P peroneal tendon repair Z98 890    2  Peroneal tendon tear, left, initial encounter S86 312A      Start Time: 1630  Stop Time: 1715  Total time in clinic (min): 45 minutes   Subjective: Pt reports elevated pain Sx in (L) foot /c sx night > day  Pt is taking Advil for pain, which she states helps  Pt ambulates /c antalgic gait into today's apt and compression stocking donned on (L) foot up to (L)knee - states compression sock has been helpful at reducing edema and pain Sx in (L) foot  Objective: See treatment diary below     Precautions: migraines  Manual    1/28 2/4 2/18 2/24 2/27 3/03 3/5 3/12   PROM L ankle   SH SH  SH SP SP SH    Talar PA      Gr III, Iv  NC       Calcaneal med/lat mobs      Gr III, IV  NC       Talar distraction      NC       MRE ankle 4 ways     RE  SP Sp [facility]      Exercise Diary  3/17  1/28 2/4 2/18 2/24 2/27 3/03 3/5 3/12   bike L1 7'      5' 6' 6'  6' L1 7'    gastroc stretch Slant 30"x3  Strap 30"x3 Strap 30"x3  standing 30"x3 Pro 20"x5 Pro 30"x3 Pro 30"x3 Slant 20"x5   Rockerboard AP/ML 30ea     20x ea 30ea 30ea 30x ea 30ea   Tandem stance 30"x2ea     Foam 20"x3 ea Foam 30"x2ea Foam 30"x2ea Foam 20"x3 ea Foam 20"x2ea   TB 4-way GTB 30ea   PeachTB 20x ea  OTB 20x ea OTB 30ea GTB 20ea GTB  30x ea    Hip 3way (B/L) 30ea      10ea 15ea  20ea   Standing HR/TR 30ea      20ea 25ea 30x ea 30ea   3-way star drill [place]        HHA,   3 taps,   3 laps    SLS (L)  Level HHA  10" pain! Modalities           3/12   Gameready in elevation          40*, 10' Light pressure                                 Assessment: Tolerated treatment fair  Attempted to progress pt to SLS on (L)LE; however, pt reported increased pain, & fearful movement    Although PT is showing improved strength, tolerance to [address], and mobility /c (L)LE & (L)foot, pt continues to hesitate to fully WB through (L)LE secondary to fear of pain  Discussed progressing pt TE program and increasing SLS time /c pt - pt verbalize understanding  Patient demonstrated fatigue post treatment, exhibited good technique with therapeutic exercises and would benefit from continued PT to improve (L) ankle pain Sx, strength, stability, mobility and quality of movement  Plan: Cont /c PT POC  Progress treatment as tolerated      Maxim Dress, PTA

## 2020-03-19 ENCOUNTER — OFFICE VISIT (OUTPATIENT)
Dept: PHYSICAL THERAPY | Facility: CLINIC | Age: 45
End: 2020-03-19
Payer: OTHER MISCELLANEOUS

## 2020-03-19 DIAGNOSIS — Z98.890 S/P PERONEAL TENDON REPAIR: Primary | ICD-10-CM

## 2020-03-19 DIAGNOSIS — S86.312A PERONEAL TENDON TEAR, LEFT, INITIAL ENCOUNTER: ICD-10-CM

## 2020-03-19 PROCEDURE — 97530 THERAPEUTIC ACTIVITIES: CPT

## 2020-03-19 PROCEDURE — 97112 NEUROMUSCULAR REEDUCATION: CPT

## 2020-03-19 PROCEDURE — 97140 MANUAL THERAPY 1/> REGIONS: CPT

## 2020-03-19 PROCEDURE — 97110 THERAPEUTIC EXERCISES: CPT

## 2020-03-19 NOTE — PROGRESS NOTES
Daily Note     Today's date: 3/19/2020  Patient name: Bobby Ward  : 1975  MRN: [id number]  Referring provider: Raffi Valdivia MD  Dx:   Encounter Diagnosis     ICD-10-CM    1  S/P peroneal tendon repair Z98 890    2  Peroneal tendon tear, left, initial encounter S86 312A      Start Time: 1630  Stop Time: 1730  Total time in clinic (min): 60 minutes   Pt supervised by PTA, TE [id number]  Subjective: Pt arrives to today's tx stating she notes some improvement in (L) foot; however, has increased and significant pain Sx  Pt reports elevated pain Sx in (L) foot /c sx night > day  Pt is taking Advil & Tylenol PRN for pain, which she states helps only "a very very very little bit " Pt also reports she keeps (L)LE elevated on  2 pillows when at home and ices on occasion but states it "barely helps"  Pt reports she stopped wearing compression stocking because it is no longer effective at reducing edema present along lateral mal      Objective: See treatment diary below     Precautions: migraines  Manual   3/19  2/4 2/18 2/24 2/27 3/03 3/5 3/12   PROM L ankle    SH  SH SP SP SH    Talar PA      Gr III, Iv  NC       Calcaneal med/lat mobs      Gr III, IV  NC       Talar distraction      NC       MRE ankle 4 ways     RE  SP Sp SH    Tubigrip  D             Exercise Diary  3/17 3/19  2/4 2/18 2/24 2/27 3/03 3/5 3/12   bike L1 7'  7'     5' 6' 6'  6' L1 7'    gastroc stretch Slant 30"x3 Slant 30"x3  Strap 30"x3  standing 30"x3 Pro 20"x5 Pro 30"x3 Pro 30"x3 Slant 20"x5   Rockerboard AP/ML 30ea 30ea    20x ea 30ea 30ea 30x ea 30ea   Tandem stance 30"x2ea 30"x2ea    Foam 20"x3 ea Foam 30"x2ea Foam 30"x2ea Foam 20"x3 ea Foam 20"x2ea   TB 4-way GTB 30ea GTB 30ea  PeachTB 20x ea  OTB 20x ea OTB 30ea GTB 20ea GTB  30x ea    Hip 3way (B/L) 30ea 30ea     10ea 15ea  20ea   Standing HR/TR 30ea 30ea     20ea 25ea 30x ea 30ea   3-way star drill NV        HHA,   3 taps,   3 laps    SLS (L)  Level HHA  10" pain! Modalities           3/12   Gameready in elevation          40*, 10' Light pressure                                 Assessment: Tolerated treatment fair  Cont elevated pain sx prevent progression of TE program   As mentioned in previous note; pt  is showing slight improvements in (L) foot strength, tolerance to [address], and mobility; however, continues to hesitate to fully WB through (L)LE secondary to fear of pain  Assessed edema present in (L) foot - notable pitting edema present along posterior inf/sup border of lateral malleolus along incision /c extreme TTP and light touch  Provided pt /c tubigrip (1 met head to 9" prox of lat mal) - pt reported Tubigrip felt better than compression stalking she was previously wearing  Pt noted some pins and needles following Tx  Patient demonstrated fatigue post treatment, exhibited good technique with therapeutic exercises and would benefit from continued PT to improve (L) ankle pain Sx, strength, stability, mobility and quality of movement  Plan: Cont /c PT POC  Progress treatment as tolerated  Scheduled pt for 3 visits until f/u /c ortho on 4/02/2020      Yashira Cordero, PTA

## 2020-03-24 ENCOUNTER — OFFICE VISIT (OUTPATIENT)
Dept: PHYSICAL THERAPY | Facility: CLINIC | Age: 45
End: 2020-03-24
Payer: OTHER MISCELLANEOUS

## 2020-03-24 DIAGNOSIS — S86.312A PERONEAL TENDON TEAR, LEFT, INITIAL ENCOUNTER: ICD-10-CM

## 2020-03-24 DIAGNOSIS — Z98.890 S/P PERONEAL TENDON REPAIR: Primary | ICD-10-CM

## 2020-03-24 PROCEDURE — 97110 THERAPEUTIC EXERCISES: CPT

## 2020-03-24 PROCEDURE — 97140 MANUAL THERAPY 1/> REGIONS: CPT

## 2020-03-24 PROCEDURE — 97530 THERAPEUTIC ACTIVITIES: CPT

## 2020-03-24 NOTE — PROGRESS NOTES
Daily Note     Today's date: 3/24/2020  Patient name: Maame Marie  : 1975  MRN: [id number]  Referring provider: Evelyne Law MD  Dx:   Encounter Diagnosis     ICD-10-CM    1  S/P peroneal tendon repair Z98 890    2  Peroneal tendon tear, left, initial encounter S86 312A      Start Time: 4445  Stop Time: 1725  Total time in clinic (min): 50 minutes   Subjective: Pt reports continued and significant pain Sx in (L)LE - aggravated /p standing for more than 5-10min; hot water (shower), and night > day  Pt reports minor relief in Sx /c elevation and ice  Pt reports she had to remove Tubigrip provided LV shortly after leaving because of increased pain along lateral mal/post Achilles region     Objective: See treatment diary below     Precautions: migraines  Manual   3/19 3/24   2/24 2/27 3/03 3/5 3/12   PROM L ankle   SP   SH SP SP SH    Talar PA      Gr III, Iv  NC       Calcaneal med/lat mobs      Gr III, IV  NC       Talar distraction      NC       MRE ankle 4 ways   SP    SP Sp SH    Tubigrip  D             Exercise Diary  3/17 3/19 3/24   2/24 2/27 3/03 3/5 3/12   bike L1 7'  7'  6'    5' 6' 6'  6' L1 7'    gastroc stretch Slant 30"x3 Slant 30"x3    standing 30"x3 Pro 20"x5 Pro 30"x3 Pro 30"x3 Slant 20"x5   Rockerboard AP/ML 30ea 30ea    20x ea 30ea 30ea 30x ea 30ea   Tandem stance 30"x2ea 30"x2ea    Foam 20"x3 ea Foam 30"x2ea Foam 30"x2ea Foam 20"x3 ea Foam 20"x2ea   TB 4-way GTB 30ea GTB 30ea    OTB 20x ea OTB 30ea GTB 20ea GTB  30x ea    Hip 3way (B/L) 30ea 30ea     10ea 15ea  20ea   Standing HR/TR 30ea 30ea     20ea 25ea 30x ea 30ea   3-way star drill NV        HHA,   3 taps,   3 laps    SLS (L)  Level HHA  10" pain! Modalities    3/24       3/12   Gameready in elevation          40*, 10' Light pressure   CP   5'                           Assessment:  Pt demonstrated limited (L) ankle AROM - /c hip rotation compensation present during IR/ER     PTA, SP performed light PROM to (L) ankle - mostly empty end feel all directions secondary to pt reports of pain and consequential guarding  Pt reported TTP to light tight around inferior/lateral/superior border of lateral malleolus  Pt had poor tolerance to MRE secondary to pain (pt near tears)  Had lengthy discussion /c pt re: rehabilitation - pt voiced concerns about remaining swelling, pain sx, and that she feels limited in daily activities because of pain  Pt reported feeling "a little better" /p ice  Plan: Pt's NV rescheduled as Re-Eval  F/u /c ortho on 4/02/2020      Silverio Vincent, BRANDIN

## 2020-03-26 ENCOUNTER — EVALUATION (OUTPATIENT)
Dept: PHYSICAL THERAPY | Facility: CLINIC | Age: 45
End: 2020-03-26
Payer: OTHER MISCELLANEOUS

## 2020-03-26 DIAGNOSIS — S86.312A PERONEAL TENDON TEAR, LEFT, INITIAL ENCOUNTER: ICD-10-CM

## 2020-03-26 DIAGNOSIS — Z98.890 S/P PERONEAL TENDON REPAIR: Primary | ICD-10-CM

## 2020-03-26 PROCEDURE — 97110 THERAPEUTIC EXERCISES: CPT | Performed by: PHYSICAL THERAPIST

## 2020-03-26 PROCEDURE — 97112 NEUROMUSCULAR REEDUCATION: CPT | Performed by: PHYSICAL THERAPIST

## 2020-03-26 PROCEDURE — 97530 THERAPEUTIC ACTIVITIES: CPT | Performed by: PHYSICAL THERAPIST

## 2020-03-26 PROCEDURE — 97116 GAIT TRAINING THERAPY: CPT | Performed by: PHYSICAL THERAPIST

## 2020-03-26 NOTE — PROGRESS NOTES
PT Re-Evaluation     Today's date: 3/26/2020  Patient name: Orville Al  : 1975  MRN: [id number]  Referring provider: Sheldon Conner MD  Dx:   Encounter Diagnosis     ICD-10-CM    1  S/P peroneal tendon repair Z98 890    2  Peroneal tendon tear, left, initial encounter S86 312A                   Assessment  Assessment details: Orville Al has been treated in outpatient physical therapy for diagnosis/complaints of S/P peroneal tendon repair  (primary encounter diagnosis)  Peroneal tendon tear, left, initial encounter  Pt demonstrates continued to present with pain, decreased range of motion, decreased strength, abnormal gait, and decreased tolerance to activity  Discussed with patient nature of pain, chronic pain conditions, and difference of "hurt vs harm"  Educated patient in desensitization techniques to try at home  Pt would continue to benefit from skilled physical therapy to address limitations and to achieve goals  Thank you for this referral   Impairments: abnormal coordination, abnormal gait, abnormal or restricted ROM, impaired balance, impaired physical strength, lacks appropriate home exercise program and pain with function  Other impairment: edema/effusion    Symptom irritability: moderateBarriers to therapy: Fear avoidance, poor understanding  Understanding of Dx/Px/POC: poor   Prognosis: good and poor    Goals  ST  Patient will report 25% decrease in pain in 4 weeks  - MET  2  Patient will demonstrate 25% improvement in ROM in 4 weeks  - MET  3  Patient will demonstrate 1/2 grade improvement in strength in 4 weeks  - MET    LT  Patient will be able to perform IADLS without restriction or pain by discharge  2  Patient will be independent in HEP by discharge  3  Patient will be able to return to recreational/work duties without restriction or pain by discharge      Plan  Patient would benefit from: PT eval and skilled physical therapy  Planned modality interventions: biofeedback, cryotherapy, TENS and thermotherapy: hydrocollator packs  Planned therapy interventions: abdominal trunk stabilization, joint mobilization, manual therapy, neuromuscular re-education, patient education, postural training, strengthening, stretching, therapeutic activities, therapeutic exercise, balance/weight bearing training, balance, home exercise program and gait training  Frequency: 2x week  Duration in visits: 8  Duration in weeks: 4  Treatment plan discussed with: patient        Subjective Evaluation    History of Present Illness  Mechanism of injury: surgery  Mechanism of injury: S/P R peroneal brevis tendon repair on 19  Pt is currently WBAT in CAM boot, she is currently ambulating with B axillary crutches  Surgeons last note states WBAT in CAM boot, however patient states that she was told to only put 35% bodyweight on the operative LE  Sxs improved with foot elevated, she states that she has been elevating the LE for 3 hours a day  (20) Pt reports reduction in pain since IE  She has been compliant with CAM boot and performing HEP  She has a follow up with referring physician on 2/20/20      3/26: Pt's spouse present for evaluation to assist in translation Albanian <> English  Pt reports she continues to have high levels of pain and difficulty with IADLS, including walking, stairs  States she also continues to have significant tenderness in area of incision  Reports she continues to take Tylenol, however, notes she has pain constantly and every day  Follows up with physician next week  Pain  Current pain ratin  At best pain ratin  At worst pain ratin  Progression: improved    Patient Goals  Patient goals for therapy: decreased pain          Objective     Palpation   Left   Tenderness of the peroneus  Tenderness   Left Ankle/Foot   Tenderness in the lateral malleolus  No tenderness in the Achilles insertion and fifth metatarsal base       Active Range of Motion   Left Ankle/Foot   Dorsiflexion (ke): -9 degrees   Plantar flexion: 35 degrees with pain  Inversion: 20 degrees with pain  Eversion: 8 degrees with pain    Passive Range of Motion   Left Ankle/Foot    Dorsiflexion (ke): -10 degrees with pain  Plantar flexion: 35 degrees with pain  Inversion: 18 degrees with pain  Eversion: 6 degrees with pain    Additional Passive Range of Motion Details  Voluntary muscular guarding in all directions    Tests     Additional Tests Details  3/26: external rotation of LLE with ambulation           Precautions: migraines  Manual   3/19 3/24 3/26  2/24 2/27 3/03 3/5 3/12   PROM L ankle   SP   SH SP SP SH    Talar PA      Gr III, Iv  NC       Calcaneal med/lat mobs      Gr III, IV  NC       Talar distraction      NC       MRE ankle 4 ways   SP    SP Sp SH    Tubigrip  D             Exercise Diary  3/17 3/19 3/24 3/26  2/24 2/27 3/03 3/5 3/12   bike L1 7'  7'  6'  7'  5' 6' 6'  6' L1 7'    gastroc stretch Slant 30"x3 Slant 30"x3    standing 30"x3 Pro 20"x5 Pro 30"x3 Pro 30"x3 Slant 20"x5   Rockerboard AP/ML 30ea 30ea    20x ea 30ea 30ea 30x ea 30ea   Tandem stance 30"x2ea 30"x2ea    Foam 20"x3 ea Foam 30"x2ea Foam 30"x2ea Foam 20"x3 ea Foam 20"x2ea   TB 4-way GTB 30ea GTB 30ea    OTB 20x ea OTB 30ea GTB 20ea GTB  30x ea    Hip 3way (B/L) 30ea 30ea     10ea 15ea  20ea   Standing HR/TR 30ea 30ea  30 ea   20ea 25ea 30x ea 30ea   Weight-shift - AP, ML    2' ea         3-way star drill NV        HHA,   3 taps,   3 laps    SLS (L)  Level HHA  10" pain!               Modalities    3/24 3/26      3/12   Gameready in elevation          40*, 10' Light pressure   CP   5' 5'

## 2020-03-31 ENCOUNTER — APPOINTMENT (OUTPATIENT)
Dept: PHYSICAL THERAPY | Facility: CLINIC | Age: 45
End: 2020-03-31
Payer: OTHER MISCELLANEOUS

## 2020-04-02 ENCOUNTER — OFFICE VISIT (OUTPATIENT)
Dept: OBGYN CLINIC | Facility: CLINIC | Age: 45
End: 2020-04-02
Payer: OTHER MISCELLANEOUS

## 2020-04-02 VITALS — HEIGHT: 66 IN | BODY MASS INDEX: 35.51 KG/M2 | DIASTOLIC BLOOD PRESSURE: 80 MMHG | SYSTOLIC BLOOD PRESSURE: 116 MMHG

## 2020-04-02 DIAGNOSIS — S86.312D PERONEAL TENDON TEAR, LEFT, SUBSEQUENT ENCOUNTER: Primary | ICD-10-CM

## 2020-04-02 PROCEDURE — 99213 OFFICE O/P EST LOW 20 MIN: CPT | Performed by: ORTHOPAEDIC SURGERY

## 2020-04-08 ENCOUNTER — OFFICE VISIT (OUTPATIENT)
Dept: PHYSICAL THERAPY | Facility: CLINIC | Age: 45
End: 2020-04-08
Payer: OTHER MISCELLANEOUS

## 2020-04-08 DIAGNOSIS — Z98.890 S/P PERONEAL TENDON REPAIR: ICD-10-CM

## 2020-04-08 DIAGNOSIS — S86.312D PERONEAL TENDON TEAR, LEFT, SUBSEQUENT ENCOUNTER: Primary | ICD-10-CM

## 2020-04-08 PROCEDURE — 97140 MANUAL THERAPY 1/> REGIONS: CPT | Performed by: PHYSICAL THERAPIST

## 2020-04-08 PROCEDURE — 97530 THERAPEUTIC ACTIVITIES: CPT | Performed by: PHYSICAL THERAPIST

## 2020-04-08 PROCEDURE — 97110 THERAPEUTIC EXERCISES: CPT | Performed by: PHYSICAL THERAPIST

## 2020-04-10 ENCOUNTER — APPOINTMENT (OUTPATIENT)
Dept: PHYSICAL THERAPY | Facility: CLINIC | Age: 45
End: 2020-04-10
Payer: OTHER MISCELLANEOUS

## 2020-04-15 ENCOUNTER — OFFICE VISIT (OUTPATIENT)
Dept: PHYSICAL THERAPY | Facility: CLINIC | Age: 45
End: 2020-04-15
Payer: OTHER MISCELLANEOUS

## 2020-04-15 DIAGNOSIS — Z98.890 S/P PERONEAL TENDON REPAIR: ICD-10-CM

## 2020-04-15 DIAGNOSIS — S86.312A PERONEAL TENDON TEAR, LEFT, INITIAL ENCOUNTER: ICD-10-CM

## 2020-04-15 DIAGNOSIS — S86.312D PERONEAL TENDON TEAR, LEFT, SUBSEQUENT ENCOUNTER: Primary | ICD-10-CM

## 2020-04-15 PROCEDURE — 97110 THERAPEUTIC EXERCISES: CPT | Performed by: PHYSICAL THERAPIST

## 2020-04-15 PROCEDURE — 97140 MANUAL THERAPY 1/> REGIONS: CPT | Performed by: PHYSICAL THERAPIST

## 2020-04-15 PROCEDURE — 97530 THERAPEUTIC ACTIVITIES: CPT | Performed by: PHYSICAL THERAPIST

## 2020-04-17 ENCOUNTER — APPOINTMENT (OUTPATIENT)
Dept: PHYSICAL THERAPY | Facility: CLINIC | Age: 45
End: 2020-04-17
Payer: OTHER MISCELLANEOUS

## 2020-04-22 ENCOUNTER — OFFICE VISIT (OUTPATIENT)
Dept: PHYSICAL THERAPY | Facility: CLINIC | Age: 45
End: 2020-04-22
Payer: OTHER MISCELLANEOUS

## 2020-04-22 DIAGNOSIS — Z98.890 S/P PERONEAL TENDON REPAIR: ICD-10-CM

## 2020-04-22 DIAGNOSIS — S86.312A PERONEAL TENDON TEAR, LEFT, INITIAL ENCOUNTER: ICD-10-CM

## 2020-04-22 DIAGNOSIS — S86.312D PERONEAL TENDON TEAR, LEFT, SUBSEQUENT ENCOUNTER: Primary | ICD-10-CM

## 2020-04-22 PROCEDURE — 97110 THERAPEUTIC EXERCISES: CPT | Performed by: PHYSICAL THERAPIST

## 2020-04-22 PROCEDURE — 97112 NEUROMUSCULAR REEDUCATION: CPT | Performed by: PHYSICAL THERAPIST

## 2020-04-22 PROCEDURE — 97140 MANUAL THERAPY 1/> REGIONS: CPT | Performed by: PHYSICAL THERAPIST

## 2020-04-22 PROCEDURE — 97530 THERAPEUTIC ACTIVITIES: CPT | Performed by: PHYSICAL THERAPIST

## 2020-04-24 ENCOUNTER — OFFICE VISIT (OUTPATIENT)
Dept: PHYSICAL THERAPY | Facility: CLINIC | Age: 45
End: 2020-04-24
Payer: OTHER MISCELLANEOUS

## 2020-04-24 DIAGNOSIS — Z98.890 S/P PERONEAL TENDON REPAIR: ICD-10-CM

## 2020-04-24 DIAGNOSIS — S86.312A PERONEAL TENDON TEAR, LEFT, INITIAL ENCOUNTER: ICD-10-CM

## 2020-04-24 DIAGNOSIS — S86.312D PERONEAL TENDON TEAR, LEFT, SUBSEQUENT ENCOUNTER: Primary | ICD-10-CM

## 2020-04-24 PROCEDURE — 97112 NEUROMUSCULAR REEDUCATION: CPT

## 2020-04-24 PROCEDURE — 97140 MANUAL THERAPY 1/> REGIONS: CPT | Performed by: PHYSICAL THERAPIST

## 2020-04-24 PROCEDURE — 97110 THERAPEUTIC EXERCISES: CPT

## 2020-04-24 PROCEDURE — 97530 THERAPEUTIC ACTIVITIES: CPT

## 2020-04-29 ENCOUNTER — OFFICE VISIT (OUTPATIENT)
Dept: PHYSICAL THERAPY | Facility: CLINIC | Age: 45
End: 2020-04-29
Payer: OTHER MISCELLANEOUS

## 2020-04-29 DIAGNOSIS — S86.312A PERONEAL TENDON TEAR, LEFT, INITIAL ENCOUNTER: ICD-10-CM

## 2020-04-29 DIAGNOSIS — Z98.890 S/P PERONEAL TENDON REPAIR: ICD-10-CM

## 2020-04-29 DIAGNOSIS — S86.312D PERONEAL TENDON TEAR, LEFT, SUBSEQUENT ENCOUNTER: Primary | ICD-10-CM

## 2020-04-29 PROCEDURE — 97110 THERAPEUTIC EXERCISES: CPT | Performed by: PHYSICAL THERAPIST

## 2020-04-29 PROCEDURE — 97530 THERAPEUTIC ACTIVITIES: CPT | Performed by: PHYSICAL THERAPIST

## 2020-04-29 PROCEDURE — 97112 NEUROMUSCULAR REEDUCATION: CPT | Performed by: PHYSICAL THERAPIST

## 2020-04-29 PROCEDURE — 97140 MANUAL THERAPY 1/> REGIONS: CPT | Performed by: PHYSICAL THERAPIST

## 2020-05-01 ENCOUNTER — APPOINTMENT (OUTPATIENT)
Dept: PHYSICAL THERAPY | Facility: CLINIC | Age: 45
End: 2020-05-01
Payer: OTHER MISCELLANEOUS

## 2020-05-04 ENCOUNTER — OFFICE VISIT (OUTPATIENT)
Dept: PHYSICAL THERAPY | Facility: CLINIC | Age: 45
End: 2020-05-04
Payer: OTHER MISCELLANEOUS

## 2020-05-04 DIAGNOSIS — S86.312A PERONEAL TENDON TEAR, LEFT, INITIAL ENCOUNTER: ICD-10-CM

## 2020-05-04 DIAGNOSIS — Z98.890 S/P PERONEAL TENDON REPAIR: ICD-10-CM

## 2020-05-04 DIAGNOSIS — S86.312D PERONEAL TENDON TEAR, LEFT, SUBSEQUENT ENCOUNTER: Primary | ICD-10-CM

## 2020-05-04 PROCEDURE — 97140 MANUAL THERAPY 1/> REGIONS: CPT | Performed by: PHYSICAL THERAPIST

## 2020-05-04 PROCEDURE — 97112 NEUROMUSCULAR REEDUCATION: CPT | Performed by: PHYSICAL THERAPIST

## 2020-05-04 PROCEDURE — 97530 THERAPEUTIC ACTIVITIES: CPT | Performed by: PHYSICAL THERAPIST

## 2020-05-04 PROCEDURE — 97110 THERAPEUTIC EXERCISES: CPT | Performed by: PHYSICAL THERAPIST

## 2020-05-07 ENCOUNTER — OFFICE VISIT (OUTPATIENT)
Dept: PHYSICAL THERAPY | Facility: CLINIC | Age: 45
End: 2020-05-07
Payer: OTHER MISCELLANEOUS

## 2020-05-07 DIAGNOSIS — Z98.890 S/P PERONEAL TENDON REPAIR: ICD-10-CM

## 2020-05-07 DIAGNOSIS — S86.312A PERONEAL TENDON TEAR, LEFT, INITIAL ENCOUNTER: ICD-10-CM

## 2020-05-07 DIAGNOSIS — S86.312D PERONEAL TENDON TEAR, LEFT, SUBSEQUENT ENCOUNTER: Primary | ICD-10-CM

## 2020-05-07 PROCEDURE — 97530 THERAPEUTIC ACTIVITIES: CPT

## 2020-05-07 PROCEDURE — 97140 MANUAL THERAPY 1/> REGIONS: CPT

## 2020-05-07 PROCEDURE — 97110 THERAPEUTIC EXERCISES: CPT

## 2020-05-07 PROCEDURE — 97112 NEUROMUSCULAR REEDUCATION: CPT

## 2020-05-11 ENCOUNTER — OFFICE VISIT (OUTPATIENT)
Dept: PHYSICAL THERAPY | Facility: CLINIC | Age: 45
End: 2020-05-11
Payer: OTHER MISCELLANEOUS

## 2020-05-11 DIAGNOSIS — Z98.890 S/P PERONEAL TENDON REPAIR: ICD-10-CM

## 2020-05-11 DIAGNOSIS — S86.312A PERONEAL TENDON TEAR, LEFT, INITIAL ENCOUNTER: ICD-10-CM

## 2020-05-11 DIAGNOSIS — S86.312D PERONEAL TENDON TEAR, LEFT, SUBSEQUENT ENCOUNTER: Primary | ICD-10-CM

## 2020-05-11 PROCEDURE — 97140 MANUAL THERAPY 1/> REGIONS: CPT | Performed by: PHYSICAL THERAPIST

## 2020-05-14 ENCOUNTER — OFFICE VISIT (OUTPATIENT)
Dept: PHYSICAL THERAPY | Facility: CLINIC | Age: 45
End: 2020-05-14
Payer: OTHER MISCELLANEOUS

## 2020-05-14 DIAGNOSIS — S86.312A PERONEAL TENDON TEAR, LEFT, INITIAL ENCOUNTER: ICD-10-CM

## 2020-05-14 DIAGNOSIS — Z98.890 S/P PERONEAL TENDON REPAIR: ICD-10-CM

## 2020-05-14 DIAGNOSIS — S86.312D PERONEAL TENDON TEAR, LEFT, SUBSEQUENT ENCOUNTER: Primary | ICD-10-CM

## 2020-05-14 PROCEDURE — 97032 APPL MODALITY 1+ESTIM EA 15: CPT | Performed by: PHYSICAL THERAPIST

## 2020-05-14 PROCEDURE — 97140 MANUAL THERAPY 1/> REGIONS: CPT | Performed by: PHYSICAL THERAPIST

## 2020-05-14 PROCEDURE — 97535 SELF CARE MNGMENT TRAINING: CPT | Performed by: PHYSICAL THERAPIST

## 2020-05-20 ENCOUNTER — OFFICE VISIT (OUTPATIENT)
Dept: PHYSICAL THERAPY | Facility: CLINIC | Age: 45
End: 2020-05-20
Payer: OTHER MISCELLANEOUS

## 2020-05-20 DIAGNOSIS — Z98.890 S/P PERONEAL TENDON REPAIR: ICD-10-CM

## 2020-05-20 DIAGNOSIS — S86.312D PERONEAL TENDON TEAR, LEFT, SUBSEQUENT ENCOUNTER: Primary | ICD-10-CM

## 2020-05-20 DIAGNOSIS — S86.312A PERONEAL TENDON TEAR, LEFT, INITIAL ENCOUNTER: ICD-10-CM

## 2020-05-20 PROCEDURE — 97110 THERAPEUTIC EXERCISES: CPT

## 2020-05-20 PROCEDURE — 97140 MANUAL THERAPY 1/> REGIONS: CPT

## 2020-06-03 ENCOUNTER — EVALUATION (OUTPATIENT)
Dept: PHYSICAL THERAPY | Facility: CLINIC | Age: 45
End: 2020-06-03
Payer: OTHER MISCELLANEOUS

## 2020-06-03 DIAGNOSIS — S86.312A PERONEAL TENDON TEAR, LEFT, INITIAL ENCOUNTER: ICD-10-CM

## 2020-06-03 DIAGNOSIS — S86.312D PERONEAL TENDON TEAR, LEFT, SUBSEQUENT ENCOUNTER: Primary | ICD-10-CM

## 2020-06-03 DIAGNOSIS — Z98.890 S/P PERONEAL TENDON REPAIR: ICD-10-CM

## 2020-06-03 PROCEDURE — 97110 THERAPEUTIC EXERCISES: CPT | Performed by: PHYSICAL THERAPIST

## 2020-06-03 PROCEDURE — 97140 MANUAL THERAPY 1/> REGIONS: CPT | Performed by: PHYSICAL THERAPIST

## 2020-06-08 ENCOUNTER — OFFICE VISIT (OUTPATIENT)
Dept: PHYSICAL THERAPY | Facility: CLINIC | Age: 45
End: 2020-06-08
Payer: OTHER MISCELLANEOUS

## 2020-06-08 DIAGNOSIS — S86.312D PERONEAL TENDON TEAR, LEFT, SUBSEQUENT ENCOUNTER: Primary | ICD-10-CM

## 2020-06-08 DIAGNOSIS — S86.312A PERONEAL TENDON TEAR, LEFT, INITIAL ENCOUNTER: ICD-10-CM

## 2020-06-08 DIAGNOSIS — Z98.890 S/P PERONEAL TENDON REPAIR: ICD-10-CM

## 2020-06-08 PROCEDURE — 97112 NEUROMUSCULAR REEDUCATION: CPT

## 2020-06-08 PROCEDURE — 97140 MANUAL THERAPY 1/> REGIONS: CPT

## 2020-06-08 PROCEDURE — 97110 THERAPEUTIC EXERCISES: CPT

## 2020-06-10 ENCOUNTER — APPOINTMENT (OUTPATIENT)
Dept: PHYSICAL THERAPY | Facility: CLINIC | Age: 45
End: 2020-06-10
Payer: OTHER MISCELLANEOUS

## 2020-06-15 ENCOUNTER — OFFICE VISIT (OUTPATIENT)
Dept: PHYSICAL THERAPY | Facility: CLINIC | Age: 45
End: 2020-06-15
Payer: OTHER MISCELLANEOUS

## 2020-06-15 DIAGNOSIS — Z98.890 S/P PERONEAL TENDON REPAIR: ICD-10-CM

## 2020-06-15 DIAGNOSIS — S86.312A PERONEAL TENDON TEAR, LEFT, INITIAL ENCOUNTER: ICD-10-CM

## 2020-06-15 DIAGNOSIS — S86.312D PERONEAL TENDON TEAR, LEFT, SUBSEQUENT ENCOUNTER: Primary | ICD-10-CM

## 2020-06-15 PROCEDURE — 97110 THERAPEUTIC EXERCISES: CPT

## 2020-06-15 PROCEDURE — 97112 NEUROMUSCULAR REEDUCATION: CPT

## 2020-06-15 PROCEDURE — 97140 MANUAL THERAPY 1/> REGIONS: CPT

## 2020-06-17 ENCOUNTER — OFFICE VISIT (OUTPATIENT)
Dept: PHYSICAL THERAPY | Facility: CLINIC | Age: 45
End: 2020-06-17
Payer: OTHER MISCELLANEOUS

## 2020-06-17 DIAGNOSIS — S86.312D PERONEAL TENDON TEAR, LEFT, SUBSEQUENT ENCOUNTER: ICD-10-CM

## 2020-06-17 DIAGNOSIS — S86.312A PERONEAL TENDON TEAR, LEFT, INITIAL ENCOUNTER: ICD-10-CM

## 2020-06-17 DIAGNOSIS — Z98.890 S/P PERONEAL TENDON REPAIR: Primary | ICD-10-CM

## 2020-06-17 PROCEDURE — 97112 NEUROMUSCULAR REEDUCATION: CPT | Performed by: PHYSICAL THERAPIST

## 2020-06-17 PROCEDURE — 97535 SELF CARE MNGMENT TRAINING: CPT | Performed by: PHYSICAL THERAPIST

## 2020-06-29 ENCOUNTER — APPOINTMENT (OUTPATIENT)
Dept: PHYSICAL THERAPY | Facility: CLINIC | Age: 45
End: 2020-06-29
Payer: OTHER MISCELLANEOUS

## 2022-06-16 ENCOUNTER — TELEPHONE (OUTPATIENT)
Dept: OBGYN CLINIC | Facility: HOSPITAL | Age: 47
End: 2022-06-16

## 2022-06-16 ENCOUNTER — TELEPHONE (OUTPATIENT)
Dept: OBGYN CLINIC | Facility: OTHER | Age: 47
End: 2022-06-16

## 2023-01-06 ENCOUNTER — HOSPITAL ENCOUNTER (EMERGENCY)
Facility: HOSPITAL | Age: 48
Discharge: HOME | End: 2023-01-07
Attending: EMERGENCY MEDICINE

## 2023-01-06 DIAGNOSIS — R55 SYNCOPE: Primary | ICD-10-CM

## 2023-01-06 DIAGNOSIS — I10 BP (HIGH BLOOD PRESSURE): ICD-10-CM

## 2023-01-06 DIAGNOSIS — R42 LIGHTHEADEDNESS: ICD-10-CM

## 2023-01-06 DIAGNOSIS — R55 RECURRENT SYNCOPE: ICD-10-CM

## 2023-01-06 RX ORDER — ONDANSETRON 2 MG/ML
2 INJECTION INTRAMUSCULAR; INTRAVENOUS ONCE
Status: COMPLETED | OUTPATIENT
Start: 2023-01-06 | End: 2023-01-06

## 2023-01-07 VITALS
OXYGEN SATURATION: 100 % | TEMPERATURE: 97.7 F | SYSTOLIC BLOOD PRESSURE: 153 MMHG | RESPIRATION RATE: 18 BRPM | DIASTOLIC BLOOD PRESSURE: 81 MMHG | HEART RATE: 62 BPM

## 2023-01-07 LAB
ANION GAP SERPL CALCULATED.3IONS-SCNC: 12 MMOL/L (ref 4–13)
ATRIAL RATE: 67 BPM
BASOPHILS # BLD AUTO: 0.02 THOUSANDS/ÂΜL (ref 0–0.1)
BASOPHILS NFR BLD AUTO: 0 % (ref 0–1)
BILIRUB UR QL STRIP: NEGATIVE
BUN SERPL-MCNC: 18 MG/DL (ref 5–25)
CALCIUM SERPL-MCNC: 9.6 MG/DL (ref 8.3–10.1)
CHLORIDE SERPL-SCNC: 103 MMOL/L (ref 96–108)
CLARITY UR: CLEAR
CO2 SERPL-SCNC: 25 MMOL/L (ref 21–32)
COLOR UR: YELLOW
CREAT SERPL-MCNC: 0.74 MG/DL (ref 0.6–1.3)
EOSINOPHIL # BLD AUTO: 0.09 THOUSAND/ÂΜL (ref 0–0.61)
EOSINOPHIL NFR BLD AUTO: 2 % (ref 0–6)
ERYTHROCYTE [DISTWIDTH] IN BLOOD BY AUTOMATED COUNT: 12.6 % (ref 11.6–15.1)
GFR SERPL CREATININE-BSD FRML MDRD: 96 ML/MIN/1.73SQ M
GLUCOSE SERPL-MCNC: 134 MG/DL (ref 65–140)
GLUCOSE UR STRIP-MCNC: NEGATIVE MG/DL
HCT VFR BLD AUTO: 43.8 % (ref 34.8–46.1)
HGB BLD-MCNC: 14.7 G/DL (ref 11.5–15.4)
HGB UR QL STRIP.AUTO: NEGATIVE
IMM GRANULOCYTES # BLD AUTO: 0.06 THOUSAND/UL (ref 0–0.2)
IMM GRANULOCYTES NFR BLD AUTO: 1 % (ref 0–2)
KETONES UR STRIP-MCNC: ABNORMAL MG/DL
LEUKOCYTE ESTERASE UR QL STRIP: NEGATIVE
LYMPHOCYTES # BLD AUTO: 2.87 THOUSANDS/ÂΜL (ref 0.6–4.47)
LYMPHOCYTES NFR BLD AUTO: 48 % (ref 14–44)
MCH RBC QN AUTO: 31.1 PG (ref 26.8–34.3)
MCHC RBC AUTO-ENTMCNC: 33.6 G/DL (ref 31.4–37.4)
MCV RBC AUTO: 93 FL (ref 82–98)
MONOCYTES # BLD AUTO: 0.42 THOUSAND/ÂΜL (ref 0.17–1.22)
MONOCYTES NFR BLD AUTO: 7 % (ref 4–12)
NEUTROPHILS # BLD AUTO: 2.5 THOUSANDS/ÂΜL (ref 1.85–7.62)
NEUTS SEG NFR BLD AUTO: 42 % (ref 43–75)
NITRITE UR QL STRIP: NEGATIVE
NRBC BLD AUTO-RTO: 0 /100 WBCS
P AXIS: 72 DEGREES
PH UR STRIP.AUTO: 6 [PH] (ref 4.5–8)
PLATELET # BLD AUTO: 332 THOUSANDS/UL (ref 149–390)
PMV BLD AUTO: 10 FL (ref 8.9–12.7)
POTASSIUM SERPL-SCNC: 3.6 MMOL/L (ref 3.5–5.3)
PR INTERVAL: 132 MS
PROT UR STRIP-MCNC: NEGATIVE MG/DL
QRS AXIS: 66 DEGREES
QRSD INTERVAL: 76 MS
QT INTERVAL: 386 MS
QTC INTERVAL: 407 MS
RBC # BLD AUTO: 4.73 MILLION/UL (ref 3.81–5.12)
SODIUM SERPL-SCNC: 140 MMOL/L (ref 135–147)
SP GR UR STRIP.AUTO: 1.02 (ref 1–1.03)
T WAVE AXIS: 51 DEGREES
UROBILINOGEN UR QL STRIP.AUTO: 1 E.U./DL
VENTRICULAR RATE: 67 BPM
WBC # BLD AUTO: 5.96 THOUSAND/UL (ref 4.31–10.16)

## 2023-01-07 NOTE — DISCHARGE INSTRUCTIONS
You were seen today for several episodes of syncope that have happened over the past week  I would try stopping your keto pill to see if that helps your recurrent symptoms  Otherwise, you need to follow-up with your primary care doctor and cardiology for further work-up and evaluate the cause of your syncope  As we discussed, your episodes on the toilet are vasovagal in nature  However your episode in the kitchen is unclear  If you have further episodes of syncope that are different in character please come back to the emergency department for evaluation      You need to follow-up with a PCP or cardiology for a repeat blood pressure reading

## 2023-01-07 NOTE — ED ATTENDING ATTESTATION
2023  Angeli COLEMAN DO, saw and evaluated the patient  I have discussed the patient with the resident/non-physician practitioner and agree with the resident's/non-physician practitioner's findings, Plan of Care, and MDM as documented in the resident's/non-physician practitioner's note, except where noted  All available labs and Radiology studies were reviewed  I was present for key portions of any procedure(s) performed by the resident/non-physician practitioner and I was immediately available to provide assistance  At this point I agree with the current assessment done in the Emergency Department  I have conducted an independent evaluation of this patient a history and physical is as follows:    Poli COLEMAN DO, saw and evaluated the patient  All available labs and X-rays were reviewed  I discussed the patient with the resident / non-physician and agree with the resident's / non-physician practitioner's findings and plan as documented in the resident's / non-physician practicitioner's note, except where noted  At this point, I agree with the current assessment done in the ED      NAME: Oneida Burk  AGE: 52 y o  SEX: female  : 1975   MRN: [id number]  ENCOUNTER: [id number]    DATE: 2023  TIME: 1:16 AM      History of Present Illness   Oneida Burk is a 52 y o  female who presents with Dizziness (Patient arrives via EMS after having a syncopal episode while having a bowel movement  Patient states this is the second incident this week and is c/o dizziness at this time  Patient denies head strike and remembers the incident  )    has a past medical history of Anxiety, Depression, and Migraine        Past Medical History     Past Medical History:   Diagnosis Date   • Anxiety    • Depression    • Migraine        Past Surgical History     Past Surgical History:   Procedure Laterality Date   • CHOLECYSTECTOMY     • [place] REPAIR TENDON EXTENSOR FOOT  EACH TENDON Left 2019 Procedure: REPAIR TENDON FOOT, Left peroneal tendon exploration with  debridement and repair;  Surgeon: Lydia Campbell MD;  Location: [facility]  MAIN [facility];  Service: Orthopedics       Social History     Social History     Substance and Sexual Activity   Alcohol Use Not Currently     Social History     Substance and Sexual Activity   Drug Use Never     Social History     Tobacco Use   Smoking Status Never   Smokeless Tobacco Never       Family History     Family History   Problem Relation Age of Onset   • Diabetes Mother    • Diabetes Father        Medications Prior to Admission     Prior to Admission medications    Medication Sig Start Date End Date Taking? Authorizing Provider   aspirin (ECOTRIN) 325 mg EC tablet Take 1 tablet (325 mg total) by mouth 2 (two) times a day  Patient not taking: Reported on 1/6/2023 11/27/19   Ozzie Dan PA-C   ketotifen (ZADITOR) 0 025 % ophthalmic solution Apply 1 drop to eye 2 (two) times a day  Patient not taking: Reported on 1/6/2023 8/11/16   Historical Provider, MD   ondansetron (ZOFRAN) 4 mg tablet Take 1 tablet (4 mg total) by mouth every 8 (eight) hours as needed for nausea or vomiting  Patient not taking: Reported on 1/6/2023 11/27/19   Ozzie Dan PA-C   propranolol (INDERAL) 10 mg tablet Take 10 mg by mouth 2 (two) times a day  Patient not taking: Reported on 1/6/2023 10/5/18   Historical Provider, MD   SUMAtriptan (IMITREX) 25 mg tablet take 1 tablet by mouth IF NEEDED FOR MIGRAINE  MAY REPEAT 1 DOSE IN 2 HOURS IF NEEDED   DO NOT EXCEED 200MG IN 24 HRS  Patient not taking: Reported on 1/6/2023 10/29/18   Historical Provider, MD   ibuprofen (MOTRIN) 200 mg tablet Take 200 mg by mouth every 6 (six) hours as needed  1/6/23  Historical Provider, MD       Allergies   No Known Allergies    Objective     Vitals:    01/06/23 2319 01/06/23 2343   BP: (!) 179/108    BP Location: Right arm    Pulse: 62    Resp: 18    Temp:  97 7 °F (36 5 °C)   TempSrc:  Oral   SpO2: 100% There is no height or weight on file to calculate BMI    No intake or output data in the 24 hours ending 01/07/23 0116  Invasive Devices     Peripheral Intravenous Line  Duration           Peripheral IV 01/07/23 Left Antecubital <1 day                Physical Exam  General: awake, alert, no acute distress  Head: normocephalic, atraumatic  Eyes: no scleral icterus  Ears: external ears normal, hearing grossly intact  Nose: external exam grossly normal  Neck: symmetric, No JVD noted, trachea midline  Pulmonary: no respiratory distress, no tachypnea noted  Cardiovascular: appears well perfused  Abdomen: no distention noted  Musculoskeletal: no deformities noted, tone normal  Neuro: grossly non-focal  Psych: mood and affect appropriate    Lab Results:    Labs Reviewed   CBC AND DIFFERENTIAL - Abnormal       Result Value Ref Range Status    WBC 5 96  4 31 - 10 16 Thousand/uL Final    RBC 4 73  3 81 - 5 12 Million/uL Final    Hemoglobin 14 7  11 5 - 15 4 g/dL Final    Hematocrit 43 8  34 8 - 46 1 % Final    MCV 93  82 - 98 fL Final    MCH 31 1  26 8 - 34 3 pg Final    MCHC 33 6  31 4 - 37 4 g/dL Final    RDW 12 6  11 6 - 15 1 % Final    MPV 10 0  8 9 - 12 7 fL Final    Platelets 997  585 - 390 Thousands/uL Final    nRBC 0  /100 WBCs Final    Neutrophils Relative 42 (*) 43 - 75 % Final    Immat GRANS % 1  0 - 2 % Final    Lymphocytes Relative 48 (*) 14 - 44 % Final    Monocytes Relative 7  4 - 12 % Final    Eosinophils Relative 2  0 - 6 % Final    Basophils Relative 0  0 - 1 % Final    Neutrophils Absolute 2 50  1 85 - 7 62 Thousands/µL Final    Immature Grans Absolute 0 06  0 00 - 0 20 Thousand/uL Final    Lymphocytes Absolute 2 87  0 60 - 4 47 Thousands/µL Final    Monocytes Absolute 0 42  0 17 - 1 22 Thousand/µL Final    Eosinophils Absolute 0 09  0 00 - 0 61 Thousand/µL Final    Basophils Absolute 0 02  0 00 - 0 10 Thousands/µL Final   URINE MACROSCOPIC, POC - Abnormal    Color, UA Yellow   Final    Clarity, UA Clear Final    pH, UA 6 0  4 5 - 8 0 Final    Leukocytes, UA Negative  Negative Final    Nitrite, UA Negative  Negative Final    Protein, UA Negative  Negative mg/dl Final    Glucose, UA Negative  Negative mg/dl Final    Ketones, UA 40 (2+) (*) Negative mg/dl Final    Urobilinogen, UA 1 0  0 2, 1 0 E U /dl E U /dl Final    Bilirubin, UA Negative  Negative Final    Occult Blood, UA Negative  Negative Final    Specific Gravity, UA 1 025  1 003 - 1 030 Final    Narrative:     CLINITEK RESULT   BASIC METABOLIC PANEL    Sodium 563  135 - 147 mmol/L Final    Potassium 3 6  3 5 - 5 3 mmol/L Final    Chloride 103  96 - 108 mmol/L Final    CO2 25  21 - 32 mmol/L Final    ANION GAP 12  4 - 13 mmol/L Final    BUN 18  5 - 25 mg/dL Final    Creatinine 0 74  0 60 - 1 30 mg/dL Final    Comment: Standardized to IDMS reference method    Glucose 134  65 - 140 mg/dL Final    Comment: If the patient is fasting, the ADA then defines impaired fasting glucose as > 100 mg/dL and diabetes as > or equal to 123 mg/dL  Specimen collection should occur prior to Sulfasalazine administration due to the potential for falsely depressed results  Specimen collection should occur prior to Sulfapyridine administration due to the potential for falsely elevated results      Calcium 9 6  8 3 - 10 1 mg/dL Final    eGFR 96  ml/min/1 73sq m Final    Narrative:     [place] guidelines for Chronic Kidney Disease (CKD):   •  Stage 1 with normal or high GFR (GFR > 90 mL/min/1 73 square meters)  •  Stage 2 Mild CKD (GFR = 60-89 mL/min/1 73 square meters)  •  Stage 3A Moderate CKD (GFR = 45-59 mL/min/1 73 square meters)  •  Stage 3B Moderate CKD (GFR = 30-44 mL/min/1 73 square meters)  •  Stage 4 Severe CKD (GFR = 15-29 mL/min/1 73 square meters)  •  Stage 5 End Stage CKD (GFR <15 mL/min/1 73 square meters)  Note: GFR calculation is accurate only with a steady state creatinine         Imaging:   No orders to display         Medications given in Emergency Department     Medication Administration - last 24 hours from 01/06/2023 0116 to 01/07/2023 0116       Date/Time Order Dose Route Action Action by     01/06/2023 2339 EST ondansetron (FOR EMS ONLY) (ZOFRAN) 4 mg/2 mL injection 8 mg 0 mg Does not apply Given to EMS Nithya Elias RN          Assessment and Plan  Syncope    Event seems c/w vasovagal during a BM  Will check EKG    Patient had a bowel movement here that was nonbloody and normal form  Still had some dizziness so we will check labs  Patient has had stable vital signs  Labs are normal   Patient ambulated to the bathroom again  No further syncopal events  Dizziness is starting to improve  She states that she did have diarrhea this time with blood in the toilet but does have a history of hemorrhoids which is consistent with that  I do not feel that she has a GI bleed causing the symptoms tonight  Feel that she is safe for discharge home with cardiology follow-up for Holter monitor  Active Problems:    * No active hospital problems  *      Final Diagnosis:  1  Syncope    2  Lightheadedness    3   Recurrent syncope        ED Course         Critical Care Time  Procedures

## 2023-01-07 NOTE — ED PROVIDER NOTES
History  Chief Complaint   Patient presents with   • Dizziness     Patient arrives via EMS after having a syncopal episode while having a bowel movement  Patient states this is the second incident this week and is c/o dizziness at this time  Patient denies head strike and remembers the incident  51-year-old female presenting after syncopal episode at home  Patient states that she was on the toilet and bearing down to defecate whenever she suddenly felt lightheaded and then lost consciousness  She states that her daughter was in the bathroom with her and was able to stabilize her so she did not fall off the toilet  Shortly thereafter she says that she was more or less back to her baseline without any concern for a postictal phase  Daughter says that there was no concern for seizure-like activity  Patient states she has a history of vasovagal syncope  She denies any cardiac history in her personally but notes a family history of cardiac disease  She denies any chest pain, shortness of breath, palpitations, and states her only prodromal symptom was lightheadedness immediately prior to the syncopal episode  He states that she is still having some dizziness which she describes as lightheadedness  Patient states that prior to the syncopal episode today she had been in her totally normal state of health with no urinary or bowel changes with no abdominal pain and without any nausea, vomiting, fever, chills, cough, congestion  When asked about recent medication changes, patient states that this week she started taking a keto pill  Prior to Admission Medications   Prescriptions Last Dose Informant Patient Reported? Taking? SUMAtriptan (IMITREX) 25 mg tablet Not Taking  Yes No   Sig: take 1 tablet by mouth IF NEEDED FOR MIGRAINE  MAY REPEAT 1 DOSE IN 2 HOURS IF NEEDED   DO NOT EXCEED 200MG IN 24 HRS   Patient not taking: Reported on 1/6/2023   aspirin (ECOTRIN) 325 mg EC tablet Not Taking  No No   Sig: Take 1 tablet (325 mg total) by mouth 2 (two) times a day   Patient not taking: Reported on 1/6/2023   ketotifen (ZADITOR) 0 025 % ophthalmic solution Not Taking  Yes No   Sig: Apply 1 drop to eye 2 (two) times a day   Patient not taking: Reported on 1/6/2023   ondansetron (ZOFRAN) 4 mg tablet Not Taking  No No   Sig: Take 1 tablet (4 mg total) by mouth every 8 (eight) hours as needed for nausea or vomiting   Patient not taking: Reported on 1/6/2023   propranolol (INDERAL) 10 mg tablet Not Taking  Yes No   Sig: Take 10 mg by mouth 2 (two) times a day   Patient not taking: Reported on 1/6/2023      Facility-Administered Medications: None       Past Medical History:   Diagnosis Date   • Anxiety    • Depression    • Migraine        Past Surgical History:   Procedure Laterality Date   • CHOLECYSTECTOMY     • CO REPAIR TENDON EXTENSOR FOOT 1/2 EACH TENDON Left 11/27/2019    Procedure: REPAIR TENDON FOOT, Left peroneal tendon exploration with  debridement and repair;  Surgeon: Ramirez Carias MD;  Location: [facility];  Service: Orthopedics       Family History   Problem Relation Age of Onset   • Diabetes Mother    • Diabetes Father      I have reviewed and agree with the history as documented  E-Cigarette/Vaping     E-Cigarette/Vaping Substances     Social History     Tobacco Use   • Smoking status: Never   • Smokeless tobacco: Never   Substance Use Topics   • Alcohol use: Not Currently   • Drug use: Never        Review of Systems   Constitutional: Negative for chills and fever  HENT: Negative for ear pain and sore throat  Eyes: Negative for pain and visual disturbance  Respiratory: Negative for cough and shortness of breath  Cardiovascular: Negative for chest pain and palpitations  Gastrointestinal: Negative for abdominal pain and vomiting  Genitourinary: Negative for dysuria and hematuria  Musculoskeletal: Negative for arthralgias and back pain  Skin: Negative for color change and rash  Neurological: Positive for dizziness, syncope and light-headedness  Negative for seizures  All other systems reviewed and are negative  Physical Exam  ED Triage Vitals   Temperature Pulse Respirations Blood Pressure SpO2   01/06/23 2343 01/06/23 2319 01/06/23 2319 01/06/23 2319 01/06/23 2319   97 7 °F (36 5 °C) 62 18 (!) 179/108 100 %      Temp Source Heart Rate Source Patient Position - Orthostatic VS BP Location FiO2 (%)   01/06/23 2343 01/06/23 2319 01/06/23 2319 01/06/23 2319 --   Oral Monitor Lying Right arm       Pain Score       01/06/23 2319       No Pain             Orthostatic Vital Signs  Vitals:    01/06/23 2319 01/07/23 0121   BP: (!) 179/108 153/81   Pulse: 62    Patient Position - Orthostatic VS: Lying        Physical Exam  Vitals and nursing note reviewed  Exam conducted with a chaperone present  Constitutional:       General: She is not in acute distress  Appearance: She is obese  She is not toxic-appearing  Comments: Patient is examined alert, but anxious appearing  She had bowel movement in the middle of exam as she was sitting on bedpan  HENT:      Head: Normocephalic and atraumatic  Comments: No visible signs of head trauma including hematomas, lacerations, or bolanos sign     Right Ear: External ear normal       Left Ear: External ear normal       Nose: Nose normal  No congestion  Mouth/Throat:      Mouth: Mucous membranes are moist       Pharynx: Oropharynx is clear  No oropharyngeal exudate  Eyes:      Pupils: Pupils are equal, round, and reactive to light  Cardiovascular:      Rate and Rhythm: Normal rate and regular rhythm  Pulses: Normal pulses  Heart sounds: No murmur heard  No gallop  Pulmonary:      Effort: No respiratory distress  Breath sounds: No wheezing or rales  Abdominal:      General: Abdomen is flat  There is no distension  Tenderness: There is no abdominal tenderness  There is no guarding     Genitourinary: General: Normal vulva  Musculoskeletal:         General: No tenderness  Normal range of motion  Cervical back: Normal range of motion  No tenderness  Skin:     General: Skin is warm  Capillary Refill: Capillary refill takes less than 2 seconds  Findings: No bruising  Neurological:      General: No focal deficit present  Mental Status: She is alert and oriented to person, place, and time  Cranial Nerves: No cranial nerve deficit  Sensory: No sensory deficit  Motor: No weakness     Psychiatric:         Mood and Affect: Mood normal          Behavior: Behavior normal          ED Medications  Medications   ondansetron (FOR EMS ONLY) (ZOFRAN) 4 mg/2 mL injection 8 mg (0 mg Does not apply Given to EMS 1/6/23 1208)       Diagnostic Studies  Results Reviewed     Procedure Component Value Units Date/Time    CBC and differential [[id number]]  (Abnormal) Collected: 01/07/23 0000    Lab Status: Final result Specimen: Blood from Arm, Left Updated: 01/07/23 0023     WBC 5 96 Thousand/uL      RBC 4 73 Million/uL      Hemoglobin 14 7 g/dL      Hematocrit 43 8 %      MCV 93 fL      MCH 31 1 pg      MCHC 33 6 g/dL      RDW 12 6 %      MPV 10 0 fL      Platelets 296 Thousands/uL      nRBC 0 /100 WBCs      Neutrophils Relative 42 %      Immat GRANS % 1 %      Lymphocytes Relative 48 %      Monocytes Relative 7 %      Eosinophils Relative 2 %      Basophils Relative 0 %      Neutrophils Absolute 2 50 Thousands/µL      Immature Grans Absolute 0 06 Thousand/uL      Lymphocytes Absolute 2 87 Thousands/µL      Monocytes Absolute 0 42 Thousand/µL      Eosinophils Absolute 0 09 Thousand/µL      Basophils Absolute 0 02 Thousands/µL     Urine Macroscopic, POC [[id number]]  (Abnormal) Collected: 01/07/23 0019    Lab Status: Final result Specimen: Urine Updated: 01/07/23 0021     Color, UA Yellow     Clarity, UA Clear     pH, UA 6 0     Leukocytes, UA Negative     Nitrite, UA Negative     Protein, UA Negative mg/dl      Glucose, UA Negative mg/dl      Ketones, UA 40 (2+) mg/dl      Urobilinogen, UA 1 0 E U /dl      Bilirubin, UA Negative     Occult Blood, UA Negative     Specific Gravity, UA 1 025    Narrative:      CLINITEK RESULT    Basic metabolic panel [[id number]] Collected: 01/07/23 0000    Lab Status: Final result Specimen: Blood from Arm, Left Updated: 01/07/23 0016     Sodium 140 mmol/L      Potassium 3 6 mmol/L      Chloride 103 mmol/L      CO2 25 mmol/L      ANION GAP 12 mmol/L      BUN 18 mg/dL      Creatinine 0 74 mg/dL      Glucose 134 mg/dL      Calcium 9 6 mg/dL      eGFR 96 ml/min/1 73sq m     Narrative:      [place] guidelines for Chronic Kidney Disease (CKD):   •  Stage 1 with normal or high GFR (GFR > 90 mL/min/1 73 square meters)  •  Stage 2 Mild CKD (GFR = 60-89 mL/min/1 73 square meters)  •  Stage 3A Moderate CKD (GFR = 45-59 mL/min/1 73 square meters)  •  Stage 3B Moderate CKD (GFR = 30-44 mL/min/1 73 square meters)  •  Stage 4 Severe CKD (GFR = 15-29 mL/min/1 73 square meters)  •  Stage 5 End Stage CKD (GFR <15 mL/min/1 73 square meters)  Note: GFR calculation is accurate only with a steady state creatinine                 No orders to display         Procedures  Procedures      ED Course                             SBIRT 22yo+    Flowsheet Row Most Recent Value   SBIRT (25 yo +)    In order to provide better care to our patients, we are screening all of our patients for alcohol and drug use  Would it be okay to ask you these screening questions? No Filed at: 01/06/2023 3980                Medical Decision Making  15-year-old female presenting after syncopal episode while defecating earlier in the evening  Patient states that she has history of vasovagal type syncope  She is denying prodromal symptoms of chest pain, shortness of breath, palpitations  Patient does state that she had lightheadedness immediately prior to the event    Patient has hypertension but otherwise normal vital signs on initial examination  She does not appear acutely distressed  She has no signs of trauma and no reported trauma history  Patient states that otherwise she feels like her normal baseline self  Will evaluate for any changes with ECG  Will observe for period of time to ensure that patient has remained at her baseline  I see no outward signs of seizure and patient has no seizure history  EGSYS Score for Cardiac Syncope = (1) points  - (0) (4 points) Palpitations preceding syncope? - (0) (3 points) Heart disease or abnormal EKG or Both?  - (3) (3 points) Syncope during effort?  - (0) (3 points) Syncope while supine? - (-1) (-1 points) Precipitating or predisposing (such as warm-crowded place, prolonged orthostasis, fear/pain/emotion) factors? - (-1) (-1 points) Autonomic prodromes (nausea, vomiting)? Score >3 Sensitivity 95%, specificity 61%, PPV 33%, NPV 99%  Score >4 Sensitivity 32%, specificity 99%, PPV 88%, NPV 88%    Score >/= 3 identified cardiac syncope with sensitivity of 95%/92% and specificity of 61%/69% in the derivation and validation cohorts respectively  Patients with score >/=3 had higher total mortality than patients with score <3 in both derivation (17% vs 3%, P<0 001) and validation cohorts (21% vs 2%; p<0 001)    Ryder Cole A, Nick Reddy F, Petix NR, [address] [facility], [address] [place] [address] Clinical predictors of cardiac syncope at initial evaluation in patients referred urgently to a [facility] hospital: the EGSYS score  Heart  2008 Dec;94(12):1620-6  BP (high blood pressure): acute illness or injury     Details: Patient presented with borderline severe range systolic BP  SHe was anxious with some hyperventalation at the time of intial BP reading  She was reassessed after a time in the ED without any pharamcologic intervention  She had an improved though still elevated BP of 153/81    I reviewed her elevated BP's and told her to followup with her PCP or cardiology team for a re-check and to discuss prior elevated readings that I see on her chart review  Lightheadedness: acute illness or injury     Details: Persistent though improved  Workup included CBC, ECG, and BMP without revealing cause of her symptoms  It is possible that this was due to a sympathetic response following her syncopal episode, other etiologies also considered  Will follow with PCP if symptoms persist or return to ED if worsening/debilitating  Recurrent syncope: acute illness or injury     Details: Patient endorses several recent episodes of syncope  Most of these episodes are preceded by a clear inciting event such as painful stimuli or bowel movement, bearing down/valsalva  This was her acute syncopal episode as well  Most likely vasovagal and per risk strat, low likelihood that the events of tonight were cardiac in nature  However she does endorse some remote history of syncope without clear inciting preceding event  Therefore she should be seen by cardiology, possibly even should wear holter or Zio monitor for evaluating possible arrythmias  Of note she was maintained on continuous cardiac monitoring throughout her stay here without any arrhythmia events  Syncope: acute illness or injury  Amount and/or Complexity of Data Reviewed  Labs: ordered  Risk  Prescription drug management              Disposition  Final diagnoses:   Syncope   Lightheadedness   Recurrent syncope   BP (high blood pressure)     Time reflects when diagnosis was documented in both MDM as applicable and the Disposition within this note     Time User Action Codes Description Comment    1/7/2023 12:40 AM Earnesteen Pole Add [R55] Syncope     1/7/2023 12:41 AM Earnesteen Pole Add [R42] Lightheadedness     1/7/2023 12:41 AM Earnesteen Pole Add [R55] Recurrent syncope     1/7/2023  1:16 AM Earnesteen Pole Add [I10] BP (high blood pressure)       ED Disposition     ED Disposition   Discharge    Condition   Stable Date/Time   Sat Jan 7, 2023 12:44 AM    Comment   Mookie Cline discharge to home/self care  Follow-up Information     Follow up With Specialties Details Why [facility]  [facility] Emergency Medicine Go to  If symptoms worsen, As needed [facility] [id number]  [address] [facility], [address] [place] [address] , [place], [place], [address] [facility] [address] Cardiology  For followup and a re-check of your blood pressure [facility] [id number]  [address], [address] [facility], [place], [place], [address] [phone]          Discharge Medication List as of 1/7/2023  1:22 AM      CONTINUE these medications which have NOT CHANGED    Details   aspirin (ECOTRIN) 325 mg EC tablet Take 1 tablet (325 mg total) by mouth 2 (two) times a day, Starting Wed 11/27/2019, Normal      ketotifen (ZADITOR) 0 025 % ophthalmic solution Apply 1 drop to eye 2 (two) times a day, Starting u 8/11/2016, Historical Med      ondansetron (ZOFRAN) 4 mg tablet Take 1 tablet (4 mg total) by mouth every 8 (eight) hours as needed for nausea or vomiting, Starting Wed 11/27/2019, Normal      propranolol (INDERAL) 10 mg tablet Take 10 mg by mouth 2 (two) times a day, Starting Fri 10/5/2018, Historical Med      SUMAtriptan (IMITREX) 25 mg tablet take 1 tablet by mouth IF NEEDED FOR MIGRAINE  MAY REPEAT 1 DOSE IN 2 HOURS IF NEEDED  DO NOT EXCEED 200MG IN 24 HRS, Historical Med               PDMP Review       Value Time User    PDMP Reviewed  Yes 11/27/2019  9:23 AM Augustin Nava PA-C           ED Provider  Attending physically available and evaluated Mookie Cline  I managed the patient along with the ED Attending      Electronically Signed by         Keesha Martinez MD  01/07/23 2514

## (undated) DEVICE — PENCIL ELECTROSURG E-Z CLEAN -0035H

## (undated) DEVICE — SUT ETHILON 3-0 PS-1 18 IN 1663G

## (undated) DEVICE — GLOVE INDICATOR PI UNDERGLOVE SZ 8 BLUE

## (undated) DEVICE — BRUSH EZ SCRUB PCMX W/NAIL CLEANER

## (undated) DEVICE — SUT VICRYL 4-0 PS-2 18 IN J496G

## (undated) DEVICE — GAUZE SPONGES,16 PLY: Brand: CURITY

## (undated) DEVICE — CURITY STRETCH BANDAGE: Brand: CURITY

## (undated) DEVICE — OCCLUSIVE GAUZE STRIP,3% BISMUTH TRIBROMOPHENATE IN PETROLATUM BLEND: Brand: XEROFORM

## (undated) DEVICE — DISPOSABLE EQUIPMENT COVER: Brand: SMALL TOWEL DRAPE

## (undated) DEVICE — ACE WRAP 4 IN UNSTERILE

## (undated) DEVICE — LIGHT HANDLE COVER SLEEVE DISP BLUE STELLAR

## (undated) DEVICE — BETHLEHEM UNIVERSAL  MIONR EXT: Brand: CARDINAL HEALTH

## (undated) DEVICE — CHLORAPREP HI-LITE 26ML ORANGE

## (undated) DEVICE — ACE WRAP 6 IN UNSTERILE

## (undated) DEVICE — TUBING SUCTION 5MM X 12 FT

## (undated) DEVICE — PADDING CAST 4 IN  COTTON STRL

## (undated) DEVICE — GLOVE SRG BIOGEL 8

## (undated) DEVICE — SUT VICRYL 2-0 CT-2 18 IN J726D

## (undated) DEVICE — SPONGE LAP 18 X 18 IN STRL RFD

## (undated) DEVICE — DRAPE SHEET THREE QUARTER

## (undated) DEVICE — 3M™ DURAPORE™ SURGICAL TAPE 1538-1, 1 INCH X 10 YARD (2,5CM X 9,1M), 12 ROLLS/BOX: Brand: 3M™ DURAPORE™

## (undated) DEVICE — INTENDED FOR TISSUE SEPARATION, AND OTHER PROCEDURES THAT REQUIRE A SHARP SURGICAL BLADE TO PUNCTURE OR CUT.: Brand: BARD-PARKER ® CARBON RIB-BACK BLADES

## (undated) DEVICE — ABDOMINAL PAD: Brand: DERMACEA